# Patient Record
Sex: FEMALE | Race: WHITE | NOT HISPANIC OR LATINO | Employment: FULL TIME | URBAN - METROPOLITAN AREA
[De-identification: names, ages, dates, MRNs, and addresses within clinical notes are randomized per-mention and may not be internally consistent; named-entity substitution may affect disease eponyms.]

---

## 2018-03-16 ENCOUNTER — PATIENT MESSAGE (OUTPATIENT)
Dept: OBSTETRICS AND GYNECOLOGY | Facility: CLINIC | Age: 38
End: 2018-03-16

## 2018-04-20 ENCOUNTER — OFFICE VISIT (OUTPATIENT)
Dept: OBSTETRICS AND GYNECOLOGY | Facility: CLINIC | Age: 38
End: 2018-04-20
Payer: COMMERCIAL

## 2018-04-20 VITALS
HEART RATE: 84 BPM | BODY MASS INDEX: 35.99 KG/M2 | RESPIRATION RATE: 16 BRPM | WEIGHT: 216 LBS | DIASTOLIC BLOOD PRESSURE: 68 MMHG | SYSTOLIC BLOOD PRESSURE: 122 MMHG | HEIGHT: 65 IN

## 2018-04-20 DIAGNOSIS — R63.5 WEIGHT GAIN: ICD-10-CM

## 2018-04-20 DIAGNOSIS — Z01.419 WELL WOMAN EXAM WITH ROUTINE GYNECOLOGICAL EXAM: Primary | ICD-10-CM

## 2018-04-20 DIAGNOSIS — R53.83 FATIGUE, UNSPECIFIED TYPE: ICD-10-CM

## 2018-04-20 DIAGNOSIS — R10.2 PELVIC PAIN IN FEMALE: ICD-10-CM

## 2018-04-20 PROCEDURE — 99999 PR PBB SHADOW E&M-EST. PATIENT-LVL III: CPT | Mod: PBBFAC,,, | Performed by: OBSTETRICS & GYNECOLOGY

## 2018-04-20 PROCEDURE — 99395 PREV VISIT EST AGE 18-39: CPT | Mod: S$GLB,,, | Performed by: OBSTETRICS & GYNECOLOGY

## 2018-04-20 NOTE — PROGRESS NOTES
Subjective:    Patient ID: Lacey Benz is a 37 y.o. female.     Chief Complaint: Annual Well Woman Exam     History of Present Illness:  Lacey MITTAL presents today for Annual Well Woman exam. She complains of fatigue, weight gain, edema, headaches, getting worse lately. She states she exercises in the GYM 5 days a week and has been watching her diet and has gained about 35-40# in the past year.  She reports pelvic pain with certain exercises mostly on her left.  She denies breast tenderness, masses, nipple discharge.  She reports Occasional urinary stress incontinence with cough, sneezing , or jumping rope. with urination. Bowel movements have not significantly changed. Her menses are absent since [facility] 3 years ago.    Menstrual History:   Patient's last menstrual period was 2015..     OB History      Para Term  AB Living    3 3 2 1 0 2    SAB TAB Ectopic Multiple Live Births    0       3            Review of Systems   Constitutional: Positive for fatigue and unexpected weight change. Negative for activity change, appetite change, chills, diaphoresis and fever.   HENT: Negative for mouth sores and tinnitus.    Eyes: Negative for discharge and visual disturbance.   Respiratory: Negative for cough, shortness of breath and wheezing.    Cardiovascular: Positive for leg swelling. Negative for chest pain and palpitations.   Gastrointestinal: Positive for abdominal pain. Negative for blood in stool, constipation, diarrhea, nausea and vomiting.   Endocrine: Negative for diabetes, hair loss, hot flashes, hyperthyroidism and hypothyroidism.   Genitourinary: Positive for urgency and urinary incontinence. Negative for decreased libido, dyspareunia, dysuria, flank pain, frequency, genital sores, hematuria, menorrhagia, menstrual problem, pelvic pain, vaginal bleeding, vaginal discharge, vaginal pain, postcoital bleeding and vaginal odor.   Musculoskeletal: Negative for back pain, joint swelling and  myalgias.   Skin:  Negative for rash, no acne and hair changes.   Neurological: Positive for headaches. Negative for seizures, syncope and numbness.   Hematological: Negative for adenopathy. Does not bruise/bleed easily.   Psychiatric/Behavioral: Negative for sleep disturbance. The patient is not nervous/anxious.    Breast: Negative for breast pain and nipple discharge        Objective:    Vital Signs:  Vitals:    04/20/18 1603   BP: 122/68   Pulse: 84   Resp: 16       Physical Exam:  General:  alert,normal appearing gravid female   Skin:  Skin color, texture, turgor normal. No rashes or lesions   HEENT:  conjunctivae/corneas clear. PERRL.   Neck: supple, trachea midline, no adenopathy or thyromegally   Respiratory:  clear to auscultation bilaterally   Heart:  regular rate and rhythm, S1, S2 normal, no murmur, click, rub or gallop   Breasts:   Nipples are protruding and have no nipple discharge. No palpable masses, erythema, skin changes, tenderness, or adenopathy.   Abdomen:  soft, non-tender. Bowel sounds normal. No masses,  no organomegaly   Pelvis: External genitalia: normal general appearance  Urinary system: urethral meatus normal, bladder nontender  Vaginal: normal mucosa without prolapse or lesions  Cervix: normal appearance  Uterus: normal single, nontender  Adnexa: normal bimanual exam   Extremities: Normal ROM; no edema, no cyanosis Bluish discolored area left calf consistent with bruise.   Neurologial: Normal strength and tone. No focal numbness or weakness. Reflexes 2+ and equal.   Psychiatric: normal mood, speech, dress, and thought processes           Assessment:      1. Well woman exam with routine gynecological exam    2. Fatigue, unspecified type    3. Weight gain    4. Pelvic pain in female          Plan:      Well woman exam with routine gynecological exam    Fatigue, unspecified type  -     CBC auto differential; Future; Expected date: 04/20/2018  -     Comprehensive metabolic panel; Future;  Expected date: 04/20/2018  -     Lipid panel; Future; Expected date: 04/20/2018  -     TSH; Future; Expected date: 04/20/2018    Weight gain  -     CBC auto differential; Future; Expected date: 04/20/2018  -     Comprehensive metabolic panel; Future; Expected date: 04/20/2018  -     Lipid panel; Future; Expected date: 04/20/2018  -     TSH; Future; Expected date: 04/20/2018    Pelvic pain in female  -     US OB/GYN Procedure ([facility]); Future; Expected date: 04/20/2018        COUNSELING:  Lacey MITTAL was counseled on A.C.O.G. Pap guidelines and recommendations for yearly pelvic exams in addition to recommendations for yearly mammograms and monthly self breast exams. In addition she was counseled on adequate intake of calcium and vitamin D; to see her PCP for other health maintenance

## 2018-04-23 ENCOUNTER — PROCEDURE VISIT (OUTPATIENT)
Dept: OBSTETRICS AND GYNECOLOGY | Facility: CLINIC | Age: 38
End: 2018-04-23
Payer: COMMERCIAL

## 2018-04-23 ENCOUNTER — LAB VISIT (OUTPATIENT)
Dept: LAB | Facility: HOSPITAL | Age: 38
End: 2018-04-23
Attending: OBSTETRICS & GYNECOLOGY
Payer: COMMERCIAL

## 2018-04-23 DIAGNOSIS — R53.83 FATIGUE, UNSPECIFIED TYPE: ICD-10-CM

## 2018-04-23 DIAGNOSIS — R63.5 WEIGHT GAIN: ICD-10-CM

## 2018-04-23 DIAGNOSIS — R10.2 PELVIC PAIN IN FEMALE: ICD-10-CM

## 2018-04-23 LAB
ALBUMIN SERPL BCP-MCNC: 3.6 G/DL
ALP SERPL-CCNC: 71 U/L
ALT SERPL W/O P-5'-P-CCNC: 20 U/L
ANION GAP SERPL CALC-SCNC: 6 MMOL/L
AST SERPL-CCNC: 20 U/L
BASOPHILS # BLD AUTO: 0.06 K/UL
BASOPHILS NFR BLD: 0.7 %
BILIRUB SERPL-MCNC: 0.4 MG/DL
BUN SERPL-MCNC: 11 MG/DL
CALCIUM SERPL-MCNC: 9.1 MG/DL
CHLORIDE SERPL-SCNC: 106 MMOL/L
CHOLEST SERPL-MCNC: 196 MG/DL
CHOLEST/HDLC SERPL: 4.1 {RATIO}
CO2 SERPL-SCNC: 27 MMOL/L
CREAT SERPL-MCNC: 0.9 MG/DL
DIFFERENTIAL METHOD: NORMAL
EOSINOPHIL # BLD AUTO: 0.2 K/UL
EOSINOPHIL NFR BLD: 1.9 %
ERYTHROCYTE [DISTWIDTH] IN BLOOD BY AUTOMATED COUNT: 12.9 %
EST. GFR  (AFRICAN AMERICAN): >60 ML/MIN/1.73 M^2
EST. GFR  (NON AFRICAN AMERICAN): >60 ML/MIN/1.73 M^2
GLUCOSE SERPL-MCNC: 111 MG/DL
HCT VFR BLD AUTO: 44.6 %
HDLC SERPL-MCNC: 48 MG/DL
HDLC SERPL: 24.5 %
HGB BLD-MCNC: 14.8 G/DL
LDLC SERPL CALC-MCNC: 128.8 MG/DL
LYMPHOCYTES # BLD AUTO: 2.2 K/UL
LYMPHOCYTES NFR BLD: 24.4 %
MCH RBC QN AUTO: 30.7 PG
MCHC RBC AUTO-ENTMCNC: 33.2 G/DL
MCV RBC AUTO: 93 FL
MONOCYTES # BLD AUTO: 0.7 K/UL
MONOCYTES NFR BLD: 8.1 %
NEUTROPHILS # BLD AUTO: 5.8 K/UL
NEUTROPHILS NFR BLD: 64.9 %
NONHDLC SERPL-MCNC: 148 MG/DL
PLATELET # BLD AUTO: 223 K/UL
PMV BLD AUTO: 10.1 FL
POTASSIUM SERPL-SCNC: 4.7 MMOL/L
PROT SERPL-MCNC: 6.7 G/DL
RBC # BLD AUTO: 4.82 M/UL
SODIUM SERPL-SCNC: 139 MMOL/L
TRIGL SERPL-MCNC: 96 MG/DL
TSH SERPL DL<=0.005 MIU/L-ACNC: 1.25 UIU/ML
WBC # BLD AUTO: 8.9 K/UL

## 2018-04-23 PROCEDURE — 85025 COMPLETE CBC W/AUTO DIFF WBC: CPT

## 2018-04-23 PROCEDURE — 80061 LIPID PANEL: CPT

## 2018-04-23 PROCEDURE — 36415 COLL VENOUS BLD VENIPUNCTURE: CPT

## 2018-04-23 PROCEDURE — 76830 TRANSVAGINAL US NON-OB: CPT | Mod: S$GLB,,, | Performed by: OBSTETRICS & GYNECOLOGY

## 2018-04-23 PROCEDURE — 80053 COMPREHEN METABOLIC PANEL: CPT

## 2018-04-23 PROCEDURE — 84443 ASSAY THYROID STIM HORMONE: CPT

## 2018-04-24 ENCOUNTER — PATIENT MESSAGE (OUTPATIENT)
Dept: OBSTETRICS AND GYNECOLOGY | Facility: CLINIC | Age: 38
End: 2018-04-24

## 2018-05-02 ENCOUNTER — PATIENT MESSAGE (OUTPATIENT)
Dept: OBSTETRICS AND GYNECOLOGY | Facility: CLINIC | Age: 38
End: 2018-05-02

## 2018-05-02 NOTE — TELEPHONE ENCOUNTER
I have confirmed you did prescribe pt her CPAP 05/28/2013. The order for her new supplies is in your red folder.    Pt also stated she was seen by you on 04/20/18, and she had her urine tested but did not receive any results. I do not see any orders for urine in pts chart. Please contact pt to address her concerns, as she has been sending pt emails inquiring about these things.

## 2018-05-03 ENCOUNTER — TELEPHONE (OUTPATIENT)
Dept: OBSTETRICS AND GYNECOLOGY | Facility: CLINIC | Age: 38
End: 2018-05-03

## 2018-05-03 NOTE — TELEPHONE ENCOUNTER
I called for Lacey but she was unavailable so I spoke to Lacey's . He was informed that her urinalysis in the [place] office was normal.

## 2018-05-03 NOTE — TELEPHONE ENCOUNTER
Signed prescription DME for CPAP faxed to [phone] per patient request. Fax confirmation received. Prescription scanned to chart. Patient notified via patient portal.

## 2018-05-15 ENCOUNTER — PATIENT MESSAGE (OUTPATIENT)
Dept: OBSTETRICS AND GYNECOLOGY | Facility: CLINIC | Age: 38
End: 2018-05-15

## 2019-04-02 ENCOUNTER — OFFICE VISIT (OUTPATIENT)
Dept: NEUROLOGY | Facility: CLINIC | Age: 39
End: 2019-04-02
Payer: COMMERCIAL

## 2019-04-02 VITALS
HEIGHT: 65 IN | WEIGHT: 229.25 LBS | HEART RATE: 70 BPM | RESPIRATION RATE: 18 BRPM | BODY MASS INDEX: 38.2 KG/M2 | SYSTOLIC BLOOD PRESSURE: 118 MMHG | DIASTOLIC BLOOD PRESSURE: 80 MMHG

## 2019-04-02 DIAGNOSIS — E66.01 SEVERE OBESITY (BMI 35.0-39.9) WITH COMORBIDITY: ICD-10-CM

## 2019-04-02 DIAGNOSIS — H47.10 PAPILLEDEMA: Primary | ICD-10-CM

## 2019-04-02 DIAGNOSIS — H93.A9 PULSATILE TINNITUS: ICD-10-CM

## 2019-04-02 DIAGNOSIS — G89.29 CHRONIC INTRACTABLE HEADACHE, UNSPECIFIED HEADACHE TYPE: ICD-10-CM

## 2019-04-02 DIAGNOSIS — R51.9 CHRONIC INTRACTABLE HEADACHE, UNSPECIFIED HEADACHE TYPE: ICD-10-CM

## 2019-04-02 PROCEDURE — 99999 PR PBB SHADOW E&M-EST. PATIENT-LVL III: ICD-10-PCS | Mod: PBBFAC,,, | Performed by: PSYCHIATRY & NEUROLOGY

## 2019-04-02 PROCEDURE — 3008F BODY MASS INDEX DOCD: CPT | Mod: CPTII,S$GLB,, | Performed by: PSYCHIATRY & NEUROLOGY

## 2019-04-02 PROCEDURE — 99999 PR PBB SHADOW E&M-EST. PATIENT-LVL III: CPT | Mod: PBBFAC,,, | Performed by: PSYCHIATRY & NEUROLOGY

## 2019-04-02 PROCEDURE — 3008F PR BODY MASS INDEX (BMI) DOCUMENTED: ICD-10-PCS | Mod: CPTII,S$GLB,, | Performed by: PSYCHIATRY & NEUROLOGY

## 2019-04-02 PROCEDURE — 99204 PR OFFICE/OUTPT VISIT, NEW, LEVL IV, 45-59 MIN: ICD-10-PCS | Mod: S$GLB,,, | Performed by: PSYCHIATRY & NEUROLOGY

## 2019-04-02 PROCEDURE — 99204 OFFICE O/P NEW MOD 45 MIN: CPT | Mod: S$GLB,,, | Performed by: PSYCHIATRY & NEUROLOGY

## 2019-04-02 NOTE — PROGRESS NOTES
"Consult from MECHELLE Ojeda    HPI: Lacey Benz is a 38 y.o. female for "papilledema and increased intracranial pressure"  The patient has severe obesity and had new visual change 3 weeks ago.   She has also has some headache and some difficulty with foggy thought processes and mild dizziness  Headaches are described as variable pain which is not constant and mild. Prior to 3 weeks ago she did have some headaches over the past couple of years, but not daily but headaches can be severe.   A few weeks ago, she saw some specs in her visual field which resolved. Optometrist saw optic nerve edema.   PCP performed MRI brain (see below).  She saw ophthalmology, Dr Frazier yesterday after optometrist suspected optic nerve edema-and he confirmed papilledema.   She has no personal history of DVT but mom  of DVT at age 26. She does not take Vit A.   She does have some pulsatile tinnitus  She has gained weight over the past few years despite exercising.   She has seen nephrology for proteinuria prior.     She lives with her  and their 2 sons. She works as a .     Review of Systems   Constitutional: Negative for fever.   HENT: Negative for nosebleeds.    Eyes: Positive for blurred vision.   Respiratory: Negative for hemoptysis.    Cardiovascular: Negative for leg swelling.   Gastrointestinal: Negative for blood in stool.   Genitourinary: Negative for hematuria.   Musculoskeletal: Negative for falls.   Skin: Negative for rash.   Neurological: Positive for dizziness and headaches.   Psychiatric/Behavioral: The patient does not have insomnia.          I have reviewed all of this patient's past medical and surgical histories as well as family and social histories and active allergies and medications as documented in the electronic medical record.    Exam:  Gen Appearance, well developed/nourished in no apparent distress  CV: 2+ distal pulses with no edema or swelling  Neuro:  MS: Awake, alert, oriented to " place, person, time, situation. Sustains attention. Recent/remote memory intact, Language is full to spontaneous speech/repetition/naming/comprehension. Fund of Knowledge is full  CN: Optic discs are not well visualized, PERRL, Extraoccular movements and visual fields are full. Normal facial sensation and strength, Hearing symmetric, Tongue and Palate are midline and strong. Shoulder Shrug symmetric and strong.  Motor: Normal bulk, tone, no abnormal movements. 5/5 strength bilateral upper/lower extremities with 2+ reflexes and bilateral plantar response  Sensory: symmetric to light touch, pain, temp, and vibration/proprioception. Romberg negative  Cerebellar: Finger-nose,Heal-shin, Rapid alternating movements intact  Gait: Normal stance, no ataxia    Imaging: MRI brain 3/20/19: No mass, but empty sella and high signal fluid around the optic nerve.   Labs:3/2018 CMP,CBC reviewed      Assessment/Plan: Lacey Benz is a 38 y.o. female with 2 years of headaches, sometimes severe now with 3 weeks of worse headaches and visual changes with pulsatile tinnitus suggesting increase intracranial pressure.   3/2019 MRI brain shows high fluid signal around the optic nerve which can be seen in Pseudotumor cerebri. Her exam is consistent with papilledema as well.   I recommend:   1. MRV brain (note her mom  of PE at age 26)  2. If this is unremarkable, she will need an LP to measure OP and r/o infection   3. If LP findings are consistent with Pseudotumor Cerebri, she will be started on Diamox and needs to reduce her severe obesity   -Monitor CMP regularly on Diamox if used (she has a history of proteinuria evaluated by nephrology prior)  4. Obtain notes from Dr Frazier, ophthalmology who is also seeing the patient.   RTC 6 weeks. Patient is asked to call for results/ further recommendations.   CC: MECHELLE Ojeda

## 2019-04-02 NOTE — LETTER
April 2, 2019      Princess Ojeda, JARED  [address]  [place]  [place] [address]           [facility]  [address] [address]  Phone: [phone]  Fax: [phone]          Patient: Lacey Benz   MR Number: [id number]   YOB: 1980   Date of Visit: 4/2/2019       Dear Princess Ojeda:    Thank you for referring Lacey Benz to me for evaluation. Attached you will find relevant portions of my assessment and plan of care.    If you have questions, please do not hesitate to call me. I look forward to following Lacey Benz along with you.    Sincerely,    Riki Patrick MD    Enclosure  CC:  No Recipients    If you would like to receive this communication electronically, please contact [email or web address] or [phone] to request more information on [facility] access.    For providers and/or their staff who would like to refer a patient to [facility], please contact us through our one-stop-shop provider referral line, [facility], at [phone].    If you feel you have received this communication in error or would no longer like to receive these types of communications, please e-mail [email or web address]

## 2019-04-05 ENCOUNTER — HOSPITAL ENCOUNTER (OUTPATIENT)
Dept: RADIOLOGY | Facility: HOSPITAL | Age: 39
Discharge: HOME | End: 2019-04-05
Attending: PSYCHIATRY & NEUROLOGY
Payer: COMMERCIAL

## 2019-04-05 DIAGNOSIS — R51.9 CHRONIC INTRACTABLE HEADACHE, UNSPECIFIED HEADACHE TYPE: ICD-10-CM

## 2019-04-05 DIAGNOSIS — G89.29 CHRONIC INTRACTABLE HEADACHE, UNSPECIFIED HEADACHE TYPE: ICD-10-CM

## 2019-04-05 DIAGNOSIS — H93.A9 PULSATILE TINNITUS: ICD-10-CM

## 2019-04-05 DIAGNOSIS — H47.10 PAPILLEDEMA: ICD-10-CM

## 2019-04-05 PROCEDURE — 70544 MR ANGIOGRAPHY HEAD W/O DYE: CPT | Mod: 26,,, | Performed by: RADIOLOGY

## 2019-04-05 PROCEDURE — 70544 MRV BRAIN WITHOUT CONTRAST: ICD-10-PCS | Mod: 26,,, | Performed by: RADIOLOGY

## 2019-04-05 PROCEDURE — 70544 MR ANGIOGRAPHY HEAD W/O DYE: CPT | Mod: TC

## 2019-04-08 DIAGNOSIS — H47.10 PAPILLEDEMA: Primary | ICD-10-CM

## 2019-04-15 ENCOUNTER — HOSPITAL ENCOUNTER (OUTPATIENT)
Dept: RADIOLOGY | Facility: HOSPITAL | Age: 39
Discharge: HOME | End: 2019-04-15
Attending: PSYCHIATRY & NEUROLOGY
Payer: COMMERCIAL

## 2019-04-15 DIAGNOSIS — H47.10 PAPILLEDEMA: ICD-10-CM

## 2019-04-15 LAB
BASOPHILS NFR CSF MANUAL: 1 %
CLARITY CSF: CLEAR
COLOR CSF: COLORLESS
EOSINOPHIL NFR CSF MANUAL: 1 %
GLUCOSE CSF-MCNC: 59 MG/DL (ref 40–70)
LYMPHOCYTES NFR CSF MANUAL: 40 % (ref 40–80)
MONOS+MACROS NFR CSF MANUAL: 7 % (ref 15–45)
NEUTROPHILS NFR CSF MANUAL: 51 % (ref 0–6)
PROT CSF-MCNC: 31 MG/DL (ref 15–40)
RBC # CSF: 431 /CU MM
SPECIMEN VOL CSF: 1.5 ML
WBC # CSF: 2 /CU MM (ref 0–5)

## 2019-04-15 PROCEDURE — 84157 ASSAY OF PROTEIN OTHER: CPT

## 2019-04-15 PROCEDURE — 62270 FL LUMBAR PUNCTURE: ICD-10-PCS | Mod: ,,, | Performed by: RADIOLOGY

## 2019-04-15 PROCEDURE — 87070 CULTURE OTHR SPECIMN AEROBIC: CPT

## 2019-04-15 PROCEDURE — 62270 DX LMBR SPI PNXR: CPT

## 2019-04-15 PROCEDURE — 77003 FLUOROGUIDE FOR SPINE INJECT: CPT | Mod: 26,,, | Performed by: RADIOLOGY

## 2019-04-15 PROCEDURE — 77003 FL LUMBAR PUNCTURE: ICD-10-PCS | Mod: 26,,, | Performed by: RADIOLOGY

## 2019-04-15 PROCEDURE — 82945 GLUCOSE OTHER FLUID: CPT

## 2019-04-15 PROCEDURE — 62270 DX LMBR SPI PNXR: CPT | Mod: ,,, | Performed by: RADIOLOGY

## 2019-04-15 PROCEDURE — 87205 SMEAR GRAM STAIN: CPT

## 2019-04-15 PROCEDURE — 89051 BODY FLUID CELL COUNT: CPT

## 2019-04-15 PROCEDURE — 86592 SYPHILIS TEST NON-TREP QUAL: CPT

## 2019-04-15 RX ORDER — ACETAZOLAMIDE 250 MG/1
TABLET ORAL
Qty: 60 TABLET | Refills: 11 | Status: SHIPPED | OUTPATIENT
Start: 2019-04-15 | End: 2019-05-30

## 2019-04-15 RX ORDER — LIDOCAINE HYDROCHLORIDE 10 MG/ML
1 INJECTION INFILTRATION; PERINEURAL ONCE
Status: DISCONTINUED | OUTPATIENT
Start: 2019-04-15 | End: 2019-04-16 | Stop reason: HOSPADM

## 2019-04-15 RX ORDER — LIDOCAINE HYDROCHLORIDE 10 MG/ML
10 INJECTION INFILTRATION; PERINEURAL ONCE
Status: DISCONTINUED | OUTPATIENT
Start: 2019-04-15 | End: 2019-04-15

## 2019-04-15 NOTE — H&P
Radiology History & Physical      SUBJECTIVE:     Chief Complaint:     History of Present Illness:  Lacey Benz is a 38 y.o. female who presents for concern for IIH/pseudotumor.   Past Medical History:   Diagnosis Date    Abnormal Pap smear of vagina     Endometriosis of cervix      Past Surgical History:   Procedure Laterality Date     SECTION      x2    CHOLECYSTECTOMY  2010    CYSTOSCOPY N/A 2015    Performed by [facility] at [facility]    DILATION AND CURETTAGE OF UTERUS      HYSTERECTOMY      HYSTERECTOMY-TOTAL LAPAROSCOPIC (TLH) N/A 2015    Performed by [facility] at [facility] OR    TONSILLECTOMY, ADENOIDECTOMY      TUBAL LIGATION         Home Meds:   Prior to Admission medications    Not on File     Anticoagulants/Antiplatelets: no anticoagulation    Allergies:   Review of patient's allergies indicates:   Allergen Reactions    Penicillins      Other reaction(s): Arthralgia (Joint Pain)     Sedation History:  no adverse reactions    Review of Systems:   Hematological: no known coagulopathies  Respiratory: no shortness of breath  Cardiovascular: no chest pain  Gastrointestinal: no abdominal pain  Genito-Urinary: no dysuria  Musculoskeletal: negative  Neurological: no TIA or stroke symptoms         OBJECTIVE:     Vital Signs (Most Recent)       Physical Exam:  ASA: 1  Mallampati: 2    General: no acute distress  Mental Status: alert and oriented to person, place and time  HEENT: normocephalic, atraumatic  Chest: unlabored breathing  Heart: no heaves  Abdomen: nondistended  Extremity: moves all extremities    Laboratory  Lab Results   Component Value Date    INR 1.0 2015       Lab Results   Component Value Date    WBC 8.90 2018    HGB 14.8 2018    HCT 44.6 2018    MCV 93 2018     2018      Lab Results   Component Value Date     (H) 2018     2018    K 4.7 2018     2018     CO2 27 04/23/2018    BUN 11 04/23/2018    CREATININE 0.9 04/23/2018    CALCIUM 9.1 04/23/2018    ALT 20 04/23/2018    AST 20 04/23/2018    ALBUMIN 3.6 04/23/2018    BILITOT 0.4 04/23/2018       ASSESSMENT/PLAN:     Sedation Plan: none  Patient will undergo lumbar puncture.    Magdy Page MD  Radiology

## 2019-04-15 NOTE — PROCEDURES
Radiology Post-Procedure Note    Pre Op Diagnosis: Concern for IIH/pseudotumor cerebri    Post Op Diagnosis: Same    Procedure: lumbar puncture    Procedure performed by: Abhishek Aguillon    Written Informed Consent Obtained: Yes    Specimen Removed: 12 mL CSF    Estimated Blood Loss: Minimal    Findings: Following written informed consent and sterile prep and drape, a 22 gauge spinal needle was inserted at L4 - L5 intralaminar space under fluoroscopic surveillance.  12 mL clear CSF removed and sent to the lab for further analysis.  There were no complications.    Opening pressure 44 cm water  Closing pressure 39 cm water    Patient tolerated procedure well.    Magdy Page MD  Radiology

## 2019-04-16 ENCOUNTER — TELEPHONE (OUTPATIENT)
Dept: NEUROLOGY | Facility: CLINIC | Age: 39
End: 2019-04-16

## 2019-04-16 NOTE — TELEPHONE ENCOUNTER
Spoke with and gave results for her LP done yesterday. She states that she had a rough time yesterday during the procedure, she wasn't getting numb and she was stuck multiple times. She states that she was unable to go to work today, she is still laying flat due to the headache she is having. She states that she took some OTC Tylenol with no relief.

## 2019-04-16 NOTE — TELEPHONE ENCOUNTER
Spoke with patient she states that the pain is worse when standing or sitting, laying down helps relieve the pain. Voiced understanding about instruction to increase fluids with caffeine and to stay laying flat for the day.

## 2019-04-16 NOTE — TELEPHONE ENCOUNTER
Is the pain worse with sitting or standing?  If so-Let her know this does occur post LP sometimes. Ask her to increase her liquids containing caffeine (mountain dew) and continue to lay flat for the day. Sometime there can a be a small leak of the spinal fluid but it usually self corrects in a day or two.  If pain worsens, she should report to the ER.   If the the pain is the same regardless of laying vs sitting/standing, let me know.   Thanks.

## 2019-04-17 LAB — VDRL CSF QL: NORMAL

## 2019-04-20 LAB
BACTERIA CSF CULT: NO GROWTH
GRAM STN SPEC: NORMAL

## 2019-05-22 ENCOUNTER — TELEPHONE (OUTPATIENT)
Dept: NEUROLOGY | Facility: CLINIC | Age: 39
End: 2019-05-22

## 2019-05-22 NOTE — TELEPHONE ENCOUNTER
Notified patient after speaking with our Nurse Practitioner Katherin Green, she is advising patient go and see and eye doctor for the mucus and blood shot eye as the Diamox usually dries out the eyes. Patient voiced understanding appointment moved up to next week.

## 2019-05-30 ENCOUNTER — LAB VISIT (OUTPATIENT)
Dept: LAB | Facility: HOSPITAL | Age: 39
End: 2019-05-30
Attending: PSYCHIATRY & NEUROLOGY
Payer: COMMERCIAL

## 2019-05-30 ENCOUNTER — OFFICE VISIT (OUTPATIENT)
Dept: NEUROLOGY | Facility: CLINIC | Age: 39
End: 2019-05-30
Payer: COMMERCIAL

## 2019-05-30 VITALS
SYSTOLIC BLOOD PRESSURE: 118 MMHG | HEIGHT: 65 IN | WEIGHT: 224.19 LBS | BODY MASS INDEX: 37.35 KG/M2 | DIASTOLIC BLOOD PRESSURE: 72 MMHG | RESPIRATION RATE: 14 BRPM | HEART RATE: 76 BPM

## 2019-05-30 DIAGNOSIS — E66.01 SEVERE OBESITY (BMI 35.0-39.9) WITH COMORBIDITY: ICD-10-CM

## 2019-05-30 DIAGNOSIS — G93.2 PSEUDOTUMOR CEREBRI: ICD-10-CM

## 2019-05-30 DIAGNOSIS — G93.2 PSEUDOTUMOR CEREBRI: Primary | ICD-10-CM

## 2019-05-30 LAB
ALBUMIN SERPL BCP-MCNC: 3.7 G/DL (ref 3.5–5.2)
ALP SERPL-CCNC: 85 U/L (ref 55–135)
ALT SERPL W/O P-5'-P-CCNC: 13 U/L (ref 10–44)
ANION GAP SERPL CALC-SCNC: 7 MMOL/L (ref 8–16)
AST SERPL-CCNC: 12 U/L (ref 10–40)
BILIRUB SERPL-MCNC: 0.3 MG/DL (ref 0.1–1)
BUN SERPL-MCNC: 8 MG/DL (ref 6–20)
CALCIUM SERPL-MCNC: 9 MG/DL (ref 8.7–10.5)
CHLORIDE SERPL-SCNC: 111 MMOL/L (ref 95–110)
CO2 SERPL-SCNC: 25 MMOL/L (ref 23–29)
CREAT SERPL-MCNC: 0.8 MG/DL (ref 0.5–1.4)
EST. GFR  (AFRICAN AMERICAN): >60 ML/MIN/1.73 M^2
EST. GFR  (NON AFRICAN AMERICAN): >60 ML/MIN/1.73 M^2
GLUCOSE SERPL-MCNC: 101 MG/DL (ref 70–110)
POTASSIUM SERPL-SCNC: 3.9 MMOL/L (ref 3.5–5.1)
PROT SERPL-MCNC: 7 G/DL (ref 6–8.4)
SODIUM SERPL-SCNC: 143 MMOL/L (ref 136–145)

## 2019-05-30 PROCEDURE — 3008F BODY MASS INDEX DOCD: CPT | Mod: CPTII,S$GLB,, | Performed by: PSYCHIATRY & NEUROLOGY

## 2019-05-30 PROCEDURE — 80053 COMPREHEN METABOLIC PANEL: CPT

## 2019-05-30 PROCEDURE — 99999 PR PBB SHADOW E&M-EST. PATIENT-LVL III: ICD-10-PCS | Mod: PBBFAC,,, | Performed by: PSYCHIATRY & NEUROLOGY

## 2019-05-30 PROCEDURE — 3008F PR BODY MASS INDEX (BMI) DOCUMENTED: ICD-10-PCS | Mod: CPTII,S$GLB,, | Performed by: PSYCHIATRY & NEUROLOGY

## 2019-05-30 PROCEDURE — 99214 PR OFFICE/OUTPT VISIT, EST, LEVL IV, 30-39 MIN: ICD-10-PCS | Mod: S$GLB,,, | Performed by: PSYCHIATRY & NEUROLOGY

## 2019-05-30 PROCEDURE — 36415 COLL VENOUS BLD VENIPUNCTURE: CPT

## 2019-05-30 PROCEDURE — 99999 PR PBB SHADOW E&M-EST. PATIENT-LVL III: CPT | Mod: PBBFAC,,, | Performed by: PSYCHIATRY & NEUROLOGY

## 2019-05-30 PROCEDURE — 99214 OFFICE O/P EST MOD 30 MIN: CPT | Mod: S$GLB,,, | Performed by: PSYCHIATRY & NEUROLOGY

## 2019-05-30 RX ORDER — BUPROPION HYDROCHLORIDE 300 MG/1
300 TABLET ORAL DAILY
COMMUNITY
End: 2020-06-10

## 2019-05-30 RX ORDER — ACETAZOLAMIDE 500 MG/1
500 CAPSULE, EXTENDED RELEASE ORAL 2 TIMES DAILY
Qty: 60 CAPSULE | Refills: 11 | Status: SHIPPED | OUTPATIENT
Start: 2019-05-30 | End: 2019-07-16

## 2019-05-30 NOTE — PROGRESS NOTES
HPI: Lacey Benz is a 38 y.o. female with 2 years of headaches, sometimes severe now with 3 weeks of worse headaches and visual changes with pulsatile tinnitus suggesting increase intracranial pressure.   3/2019 MRI brain shows high fluid signal around the optic nerve which can be seen in Pseudotumor cerebri. Her exam is consistent with papilledema as well. LP shows findings consistent with Pseudotumor cerebri      Since the last visit, the patient had a headache after LP and this resolved after 5 days of severe headache.     Her baseline headache is improved at times, but present at others.  She went to the eye doctor due to sclera changes and responded to eye drops after she called about pus/discharge from eye    She is taking Diamox 250mg BID.   Has lost 5 pounds    Her prior note from ophthalmology is reviewed- papilledema noted. She has follow up with Dr Vang  Review of Systems   Constitutional: Negative for fever.   HENT: Negative for nosebleeds.    Eyes: Positive for blurred vision.   Respiratory: Negative for hemoptysis.    Cardiovascular: Negative for leg swelling.   Gastrointestinal: Negative for blood in stool.   Genitourinary: Negative for hematuria.   Musculoskeletal: Negative for falls.   Skin: Negative for rash.   Neurological: Positive for dizziness and headaches.   Psychiatric/Behavioral: The patient does not have insomnia.          I have reviewed all of this patient's past medical and surgical histories as well as family and social histories and active allergies and medications as documented in the electronic medical record.    Exam:  Gen Appearance, well developed/nourished in no apparent distress  CV: 2+ distal pulses with no edema or swelling  Neuro:  MS: Awake, alert, oriented to place, person, time, situation. Sustains attention. Recent/remote memory intact, Language is full to spontaneous speech/repetition/naming/comprehension. Fund of Knowledge is full  CN: Optic discs are not  well visualized due to mild papilledema, PERRL, Extraoccular movements and visual fields are full. Normal facial sensation and strength, Hearing symmetric, Tongue and Palate are midline and strong. Shoulder Shrug symmetric and strong.  Motor: Normal bulk, tone, no abnormal movements. 5/5 strength bilateral upper/lower extremities with 2+ reflexes and no clonus  Sensory: symmetric to light touch, pain, temp, and vibration, Romberg negative  Cerebellar: Finger-nose,Heal-shin, Rapid alternating movements intact  Gait: Normal stance, no ataxia    Imaging: MRI brain 3/20/19: No mass, but empty sella and high signal fluid around the optic nerve.     4/2019 MRV: Normal MRV.    Labs:3/2018 CMP,CBC reviewed    [id number]: The opening pressure was 44 cm of water. CSF labs and culture all normal      Assessment/Plan: Lacey Benz is a 38 y.o. female with 2 years of headaches, sometimes severe now with 3 weeks of worse headaches and visual changes with pulsatile tinnitus suggesting increase intracranial pressure.   3/2019 MRI brain shows high fluid signal around the optic nerve which can be seen in Pseudotumor cerebri. Her exam is consistent with papilledema as well. LP shows findings consistent with Pseudotumor cerebri  I recommend:     1.  Diamox to increase to 500mg BID over 3 weeks unless side effect  -Monitor CMP today and regularly on Diamox (she has a history of proteinuria evaluated by nephrology prior). She reports that her PCP found some elevation in a liver lab prior which she never followed up on.   2. Continue to follow with ophthalmology as planned  3. Weight loss urged to reduce severe obesity which contributes to this condition  RTC 12 weeks if not improved or 6 months if better

## 2019-07-15 ENCOUNTER — PATIENT MESSAGE (OUTPATIENT)
Dept: NEUROLOGY | Facility: CLINIC | Age: 39
End: 2019-07-15

## 2019-07-16 ENCOUNTER — OFFICE VISIT (OUTPATIENT)
Dept: NEUROLOGY | Facility: CLINIC | Age: 39
End: 2019-07-16
Payer: COMMERCIAL

## 2019-07-16 ENCOUNTER — LAB VISIT (OUTPATIENT)
Dept: LAB | Facility: HOSPITAL | Age: 39
End: 2019-07-16
Attending: PSYCHIATRY & NEUROLOGY
Payer: COMMERCIAL

## 2019-07-16 VITALS
BODY MASS INDEX: 37.65 KG/M2 | DIASTOLIC BLOOD PRESSURE: 60 MMHG | HEIGHT: 65 IN | SYSTOLIC BLOOD PRESSURE: 110 MMHG | WEIGHT: 226 LBS | RESPIRATION RATE: 16 BRPM | HEART RATE: 62 BPM

## 2019-07-16 DIAGNOSIS — G93.2 PSEUDOTUMOR CEREBRI: ICD-10-CM

## 2019-07-16 DIAGNOSIS — E66.01 SEVERE OBESITY (BMI 35.0-39.9) WITH COMORBIDITY: ICD-10-CM

## 2019-07-16 DIAGNOSIS — T88.7XXA SIDE EFFECT OF MEDICATION: ICD-10-CM

## 2019-07-16 DIAGNOSIS — G93.2 PSEUDOTUMOR CEREBRI: Primary | ICD-10-CM

## 2019-07-16 LAB
ALBUMIN SERPL BCP-MCNC: 3.9 G/DL (ref 3.5–5.2)
ALP SERPL-CCNC: 90 U/L (ref 55–135)
ALT SERPL W/O P-5'-P-CCNC: 16 U/L (ref 10–44)
ANION GAP SERPL CALC-SCNC: 9 MMOL/L (ref 8–16)
AST SERPL-CCNC: 12 U/L (ref 10–40)
BILIRUB SERPL-MCNC: 0.6 MG/DL (ref 0.1–1)
BUN SERPL-MCNC: 10 MG/DL (ref 6–20)
CALCIUM SERPL-MCNC: 9.2 MG/DL (ref 8.7–10.5)
CHLORIDE SERPL-SCNC: 109 MMOL/L (ref 95–110)
CO2 SERPL-SCNC: 24 MMOL/L (ref 23–29)
CREAT SERPL-MCNC: 1 MG/DL (ref 0.5–1.4)
EST. GFR  (AFRICAN AMERICAN): >60 ML/MIN/1.73 M^2
EST. GFR  (NON AFRICAN AMERICAN): >60 ML/MIN/1.73 M^2
GLUCOSE SERPL-MCNC: 123 MG/DL (ref 70–110)
POTASSIUM SERPL-SCNC: 3.4 MMOL/L (ref 3.5–5.1)
PROT SERPL-MCNC: 7 G/DL (ref 6–8.4)
SODIUM SERPL-SCNC: 142 MMOL/L (ref 136–145)

## 2019-07-16 PROCEDURE — 80053 COMPREHEN METABOLIC PANEL: CPT

## 2019-07-16 PROCEDURE — 36415 COLL VENOUS BLD VENIPUNCTURE: CPT

## 2019-07-16 PROCEDURE — 3008F BODY MASS INDEX DOCD: CPT | Mod: CPTII,S$GLB,, | Performed by: PSYCHIATRY & NEUROLOGY

## 2019-07-16 PROCEDURE — 3008F PR BODY MASS INDEX (BMI) DOCUMENTED: ICD-10-PCS | Mod: CPTII,S$GLB,, | Performed by: PSYCHIATRY & NEUROLOGY

## 2019-07-16 PROCEDURE — 99214 OFFICE O/P EST MOD 30 MIN: CPT | Mod: S$GLB,,, | Performed by: PSYCHIATRY & NEUROLOGY

## 2019-07-16 PROCEDURE — 99999 PR PBB SHADOW E&M-EST. PATIENT-LVL III: ICD-10-PCS | Mod: PBBFAC,,, | Performed by: PSYCHIATRY & NEUROLOGY

## 2019-07-16 PROCEDURE — 99999 PR PBB SHADOW E&M-EST. PATIENT-LVL III: CPT | Mod: PBBFAC,,, | Performed by: PSYCHIATRY & NEUROLOGY

## 2019-07-16 PROCEDURE — 99214 PR OFFICE/OUTPT VISIT, EST, LEVL IV, 30-39 MIN: ICD-10-PCS | Mod: S$GLB,,, | Performed by: PSYCHIATRY & NEUROLOGY

## 2019-07-16 RX ORDER — FUROSEMIDE 20 MG/1
20 TABLET ORAL DAILY
Qty: 30 TABLET | Refills: 11 | Status: SHIPPED | OUTPATIENT
Start: 2019-07-16 | End: 2020-02-06 | Stop reason: SDUPTHER

## 2019-07-16 RX ORDER — ACETAZOLAMIDE 125 MG/1
125 TABLET ORAL 2 TIMES DAILY
Qty: 60 TABLET | Refills: 11 | Status: SHIPPED | OUTPATIENT
Start: 2019-07-16 | End: 2020-01-28

## 2019-07-16 NOTE — PROGRESS NOTES
HPI: Lacey Benz is a 38 y.o. female with 2 years of headaches, sometimes severe now with 3 weeks of worse headaches and visual changes with pulsatile tinnitus suggesting increase intracranial pressure.   3/2019 MRI brain shows high fluid signal around the optic nerve which can be seen in Pseudotumor cerebri. Her exam is consistent with papilledema as well. LP shows findings consistent with Pseudotumor cerebri      The patient emailed yesterday stating she continues with headaches after one month of diamox and with poor energy on this medication  She was asked to make this appointment    She actually stopped this medication on Sunday. She had some fluid retention in the ankles and this is resolved  She did have some tingling which was tolerable.   She was having fatigue which was troublesome.    She states her last eye exam showed some improvement.     Weight is up one pound    Review of Systems   Constitutional: Negative for fever.   HENT: Negative for nosebleeds.    Eyes: Positive for blurred vision.   Respiratory: Negative for hemoptysis.    Cardiovascular: Negative for leg swelling.   Gastrointestinal: Negative for blood in stool.   Genitourinary: Negative for hematuria.   Musculoskeletal: Negative for falls.   Skin: Negative for rash.   Neurological: Positive for dizziness and headaches.   Psychiatric/Behavioral: The patient does not have insomnia.          I have reviewed all of this patient's past medical and surgical histories as well as family and social histories and active allergies and medications as documented in the electronic medical record.    Exam:  Gen Appearance, well developed/nourished in no apparent distress  CV: 2+ distal pulses with no edema or swelling  Neuro:  MS: Awake, alert, oriented to place, person, time, situation. Sustains attention. Recent/remote memory intact, Language is full to spontaneous speech/repetition/naming/comprehension. Fund of Knowledge is full  CN: Optic  discs show no venous pulsations and mild papilledema which is improved today, PERRL, Extraoccular movements and visual fields are full. Normal facial sensation and strength, Hearing symmetric, Tongue and Palate are midline and strong. Shoulder Shrug symmetric and strong.  Motor: Normal bulk, tone, no abnormal movements. 5/5 strength bilateral upper/lower extremities with 2+ reflexes and no clonus  Sensory: symmetric to light touch, pain, temp, and vibration, Romberg negative  Cerebellar: Finger-nose,Heal-shin, Rapid alternating movements intact  Gait: Normal stance, no ataxia    Imaging: MRI brain 3/20/19: No mass, but empty sella and high signal fluid around the optic nerve.     4/2019 MRV: Normal MRV.    Labs:3/2018 CMP,CBC reviewed  5/2019 CMP reviewed    [id number]: The opening pressure was 44 cm of water. CSF labs and culture all normal      Assessment/Plan: Lacey Benz is a 38 y.o. female with 2 years of headaches, sometimes severe now with 3 weeks of worse headaches and visual changes with pulsatile tinnitus suggesting increase intracranial pressure.   3/2019 MRI brain shows high fluid signal around the optic nerve which can be seen in Pseudotumor cerebri. Her exam is consistent with papilledema as well. LP shows findings consistent with Pseudotumor cerebri  I recommend:     1.  Diamox at 500mg BID has not helped headaches and is causing fatigue/ low energy side effects and she stopped this med  2. Check CMP today  3. Try 20mg Lasix per orders  In addition to a 125mg BID Diamox (start Diamox now for 2 weeks, then start Lasix) to help reduce diamox side effects but to augment her diuresis. Add Lasix sooner if increased headaches  (she has a history of proteinuria evaluated by nephrology prior)  4. Will need further CMP monitoring on thes medication (at the next visit)  5. Continue to follow with ophthalmology as planned in October, but her eye exam is improved today  6. Weight loss urged to reduce  severe obesity which contributes to this condition and is the ultimate. She is exercising and needs to diet as well   RTC in 6 weeks and as scheduled with me in December

## 2019-07-23 ENCOUNTER — PATIENT MESSAGE (OUTPATIENT)
Dept: NEUROLOGY | Facility: CLINIC | Age: 39
End: 2019-07-23

## 2019-10-02 ENCOUNTER — OFFICE VISIT (OUTPATIENT)
Dept: NEUROLOGY | Facility: CLINIC | Age: 39
End: 2019-10-02
Payer: COMMERCIAL

## 2019-10-02 VITALS
BODY MASS INDEX: 38.57 KG/M2 | SYSTOLIC BLOOD PRESSURE: 112 MMHG | WEIGHT: 231.5 LBS | HEIGHT: 65 IN | DIASTOLIC BLOOD PRESSURE: 68 MMHG | HEART RATE: 82 BPM | RESPIRATION RATE: 18 BRPM | OXYGEN SATURATION: 97 %

## 2019-10-02 DIAGNOSIS — M54.81 OCCIPITAL NEURALGIA, UNSPECIFIED LATERALITY: ICD-10-CM

## 2019-10-02 DIAGNOSIS — E87.6 HYPOKALEMIA: ICD-10-CM

## 2019-10-02 DIAGNOSIS — Z79.899 ENCOUNTER FOR LONG-TERM CURRENT USE OF MEDICATION: ICD-10-CM

## 2019-10-02 DIAGNOSIS — M54.5 LOW BACK PAIN, UNSPECIFIED BACK PAIN LATERALITY, UNSPECIFIED CHRONICITY, WITH SCIATICA PRESENCE UNSPECIFIED: ICD-10-CM

## 2019-10-02 DIAGNOSIS — G93.2 PSEUDOTUMOR CEREBRI: Primary | ICD-10-CM

## 2019-10-02 DIAGNOSIS — M79.89 LEG SWELLING: ICD-10-CM

## 2019-10-02 PROBLEM — M54.50 LOW BACK PAIN: Status: ACTIVE | Noted: 2019-10-02

## 2019-10-02 PROCEDURE — 99999 PR PBB SHADOW E&M-EST. PATIENT-LVL V: ICD-10-PCS | Mod: PBBFAC,,, | Performed by: NURSE PRACTITIONER

## 2019-10-02 PROCEDURE — 3008F PR BODY MASS INDEX (BMI) DOCUMENTED: ICD-10-PCS | Mod: CPTII,S$GLB,, | Performed by: NURSE PRACTITIONER

## 2019-10-02 PROCEDURE — 99214 OFFICE O/P EST MOD 30 MIN: CPT | Mod: S$GLB,,, | Performed by: NURSE PRACTITIONER

## 2019-10-02 PROCEDURE — 3008F BODY MASS INDEX DOCD: CPT | Mod: CPTII,S$GLB,, | Performed by: NURSE PRACTITIONER

## 2019-10-02 PROCEDURE — 99214 PR OFFICE/OUTPT VISIT, EST, LEVL IV, 30-39 MIN: ICD-10-PCS | Mod: S$GLB,,, | Performed by: NURSE PRACTITIONER

## 2019-10-02 PROCEDURE — 99999 PR PBB SHADOW E&M-EST. PATIENT-LVL V: CPT | Mod: PBBFAC,,, | Performed by: NURSE PRACTITIONER

## 2019-10-02 NOTE — PROGRESS NOTES
HPI: Lacey Benz is a 39 y.o. female with 2 years of headaches, sometimes severe now with worse headaches and visual changes with pulsatile tinnitus suggesting increase intracranial pressure. 3/2019 MRI brain shows high fluid signal around the optic nerve which can be seen in Pseudotumor cerebri. Her exam is consistent with papilledema as well. LP shows findings consistent with Pseudotumor cerebri.     She presents today for a follow up visit. Her Diamox was reduced at her last visit, due to side effects/fatigue and Lasix was added. She continues with visual complaints/blurring, as well as headaches.     Headaches are located to the right occipital area and radiate frontally. They are stabbing in nature, and occur a few times every day. She does have associated neck pain. Blurred vision occurs regardless of headaches.     She reports to having a stabbing pain in her lower back, which is relieved by urinating. This began several months ago.     Ongoing BLE swelling. She reports excessive weight gain over the past couple years, which she was not able to lose, even doing Crossfit and a dietary program. She did have a hysterectomy three years ago, but still has her ovaries. PCP did check her TSH. She gained 6 pounds since her last visit.   Review of Systems   Constitutional: Negative for fever.   HENT: Negative for nosebleeds.    Eyes: Positive for blurred vision.   Respiratory: Negative for hemoptysis.    Cardiovascular: Positive for leg swelling.   Gastrointestinal: Negative for blood in stool.   Genitourinary: Negative for hematuria.   Musculoskeletal: Positive for neck pain. Negative for falls.   Skin: Negative for rash.   Neurological: Positive for headaches. Negative for dizziness.   Psychiatric/Behavioral: The patient does not have insomnia.        I have reviewed all of this patient's past medical and surgical histories as well as family and social histories and active allergies and medications as  documented in the electronic medical record.    Exam:  Gen Appearance, well developed/nourished in no apparent distress  CV: 2+ distal pulses with no edema or swelling  Neuro:  MS: Awake, alert, oriented to place, person, time, situation. Sustains attention. Recent/remote memory intact, Language is full to spontaneous speech/repetition/naming/comprehension. Fund of Knowledge is full  CN: Optic discs show no venous pulsations and mild papilledema which is improved today, PERRL, Extraoccular movements and visual fields are full. Normal facial sensation and strength, Hearing symmetric, Tongue and Palate are midline and strong. Shoulder Shrug symmetric and strong.  Motor: Normal bulk, tone, no abnormal movements. 5/5 strength bilateral upper/lower extremities with 2+ reflexes and no clonus  Sensory: symmetric to light touch, pain, temp, and vibration, Romberg negative  Cerebellar: Finger-nose,Heal-shin, Rapid alternating movements intact  Gait: Normal stance, no ataxia  MSK Survey: bilateral occipital tenderness, R>L.     Imaging:   MRI Brain 3/20/19: No mass, but empty sella and high signal fluid around the optic nerve.     4/2019 MRV: Normal MRV.    Labs:  3/2018 CMP,CBC reviewed  5/2019 CMP reviewed  7/2019 CMP showed mild hypokalemia    LP 2019:   The opening pressure was 44 cm of water. CSF labs and culture all normal    Assessment/Plan: Lacey Benz is a 39 y.o. female with 2 years of headaches, sometimes severe now with 3 weeks of worse headaches and visual changes with pulsatile tinnitus suggesting increase intracranial pressure. 3/2019 MRI brain shows high fluid signal around the optic nerve which can be seen in Pseudotumor cerebri. Her exam is consistent with papilledema as well. LP shows findings consistent with Pseudotumor cerebri.     I recommend:   1. Refer to Cardiology for BLE edema, not helped with Lasix. She has a history of proteinuria evaluated by nephrology prior-thought to be related to  diet at that time.    2. Recheck CMP, given Diamox/Lasix use at [facility]. Mild hypokalemia noted on last labs.   3. Check a KUB to evaluate for renal stones at [facility].   4. Please see Ob/Gyn to evaluate for hormonal causes of weight gain. Weight loss is needed to improve her pseudotumor. She is attempting to exercise, without results.   5. Continue low dose Diamox 125 mg bid and Lasix 20 mg for now.   6. Please expedite updated eye exam with Ophthalmology to this month to formally evaluate for changes to her papilledema. Consider changes to current meds based on results. Zonegran may be an option, if she doesn't have renal stones. No sulfa allergy.   7. She does have occipital neuralgia on exam, which may be the cause of her headaches, independent of her pseudotumor. Order ONB for next week.     FU 6 weeks then with Dr. Patrick in 12/2019

## 2019-10-02 NOTE — PATIENT INSTRUCTIONS
Please try to have your eye exam moved up to this month, and fax a copy of your exam to this office.     Please see Ob/Gyn to evaluate for hormonal causes of weight gain.

## 2019-10-08 ENCOUNTER — HOSPITAL ENCOUNTER (OUTPATIENT)
Dept: RADIOLOGY | Facility: HOSPITAL | Age: 39
Discharge: HOME | End: 2019-10-08
Attending: NURSE PRACTITIONER
Payer: COMMERCIAL

## 2019-10-08 ENCOUNTER — PROCEDURE VISIT (OUTPATIENT)
Dept: NEUROLOGY | Facility: CLINIC | Age: 39
End: 2019-10-08
Payer: COMMERCIAL

## 2019-10-08 DIAGNOSIS — M54.81 OCCIPITAL NEURALGIA, UNSPECIFIED LATERALITY: ICD-10-CM

## 2019-10-08 DIAGNOSIS — M54.5 LOW BACK PAIN, UNSPECIFIED BACK PAIN LATERALITY, UNSPECIFIED CHRONICITY, WITH SCIATICA PRESENCE UNSPECIFIED: ICD-10-CM

## 2019-10-08 PROCEDURE — 64405 PR NERVE BLOCK INJ, ANES/STEROID, OCCIPITAL: ICD-10-PCS | Mod: LT,S$GLB,, | Performed by: NURSE PRACTITIONER

## 2019-10-08 PROCEDURE — 64405 NJX AA&/STRD GR OCPL NRV: CPT | Mod: LT,S$GLB,, | Performed by: NURSE PRACTITIONER

## 2019-10-08 PROCEDURE — 74018 XR KUB: ICD-10-PCS | Mod: 26,,, | Performed by: RADIOLOGY

## 2019-10-08 PROCEDURE — 74018 RADEX ABDOMEN 1 VIEW: CPT | Mod: TC

## 2019-10-08 PROCEDURE — 74018 RADEX ABDOMEN 1 VIEW: CPT | Mod: 26,,, | Performed by: RADIOLOGY

## 2019-10-17 ENCOUNTER — TELEPHONE (OUTPATIENT)
Dept: NEUROLOGY | Facility: CLINIC | Age: 39
End: 2019-10-17

## 2019-10-17 RX ORDER — ZONISAMIDE 25 MG/1
100 CAPSULE ORAL NIGHTLY
Qty: 120 CAPSULE | Refills: 1 | Status: SHIPPED | OUTPATIENT
Start: 2019-10-17 | End: 2020-01-28 | Stop reason: DRUGHIGH

## 2019-10-17 NOTE — TELEPHONE ENCOUNTER
Her papilledema is improved per her recent eye exam, so I do not believe that this is the cause of her increased headaches.     Did the ONB help her headaches?

## 2019-10-17 NOTE — TELEPHONE ENCOUNTER
Let's add Zonegran for headache prevention per orders.     She will increase the medication each week over 4 weeks (instructions on Rx). If at any point her headaches resolve, she may stop increasing at that dose.     Keep a headache diary until her visit with me.

## 2019-10-17 NOTE — TELEPHONE ENCOUNTER
Notified patient, voiced understanding. She states that the ONB did help but the headaches are still there.

## 2019-12-16 ENCOUNTER — OFFICE VISIT (OUTPATIENT)
Dept: OBSTETRICS AND GYNECOLOGY | Facility: CLINIC | Age: 39
End: 2019-12-16
Payer: COMMERCIAL

## 2019-12-16 VITALS
HEART RATE: 82 BPM | BODY MASS INDEX: 38.95 KG/M2 | HEIGHT: 65 IN | SYSTOLIC BLOOD PRESSURE: 116 MMHG | WEIGHT: 233.81 LBS | RESPIRATION RATE: 18 BRPM | DIASTOLIC BLOOD PRESSURE: 74 MMHG

## 2019-12-16 DIAGNOSIS — N89.8 VAGINAL DISCHARGE: Primary | ICD-10-CM

## 2019-12-16 DIAGNOSIS — R35.0 FREQUENCY OF URINATION: ICD-10-CM

## 2019-12-16 LAB
BILIRUB SERPL-MCNC: NEGATIVE MG/DL
BLOOD URINE, POC: NEGATIVE
COLOR, POC UA: NORMAL
GLUCOSE UR QL STRIP: NORMAL
KETONES UR QL STRIP: NEGATIVE
LEUKOCYTE ESTERASE URINE, POC: NEGATIVE
NITRITE, POC UA: NEGATIVE
PH, POC UA: 5
PROTEIN, POC: 1
SPECIFIC GRAVITY, POC UA: 1.02
UROBILINOGEN, POC UA: NORMAL

## 2019-12-16 PROCEDURE — 3008F PR BODY MASS INDEX (BMI) DOCUMENTED: ICD-10-PCS | Mod: CPTII,S$GLB,, | Performed by: ADVANCED PRACTICE MIDWIFE

## 2019-12-16 PROCEDURE — 99214 OFFICE O/P EST MOD 30 MIN: CPT | Mod: 25,S$GLB,, | Performed by: ADVANCED PRACTICE MIDWIFE

## 2019-12-16 PROCEDURE — 87481 CANDIDA DNA AMP PROBE: CPT | Mod: 59

## 2019-12-16 PROCEDURE — 81002 URINALYSIS NONAUTO W/O SCOPE: CPT | Mod: S$GLB,,, | Performed by: ADVANCED PRACTICE MIDWIFE

## 2019-12-16 PROCEDURE — 87801 DETECT AGNT MULT DNA AMPLI: CPT

## 2019-12-16 PROCEDURE — 99999 PR PBB SHADOW E&M-EST. PATIENT-LVL III: ICD-10-PCS | Mod: PBBFAC,,, | Performed by: ADVANCED PRACTICE MIDWIFE

## 2019-12-16 PROCEDURE — 87661 TRICHOMONAS VAGINALIS AMPLIF: CPT

## 2019-12-16 PROCEDURE — 87491 CHLMYD TRACH DNA AMP PROBE: CPT

## 2019-12-16 PROCEDURE — 81002 POCT URINE DIPSTICK WITHOUT MICROSCOPE: ICD-10-PCS | Mod: S$GLB,,, | Performed by: ADVANCED PRACTICE MIDWIFE

## 2019-12-16 PROCEDURE — 99214 PR OFFICE/OUTPT VISIT, EST, LEVL IV, 30-39 MIN: ICD-10-PCS | Mod: 25,S$GLB,, | Performed by: ADVANCED PRACTICE MIDWIFE

## 2019-12-16 PROCEDURE — 3008F BODY MASS INDEX DOCD: CPT | Mod: CPTII,S$GLB,, | Performed by: ADVANCED PRACTICE MIDWIFE

## 2019-12-16 PROCEDURE — 87086 URINE CULTURE/COLONY COUNT: CPT

## 2019-12-16 PROCEDURE — 99999 PR PBB SHADOW E&M-EST. PATIENT-LVL III: CPT | Mod: PBBFAC,,, | Performed by: ADVANCED PRACTICE MIDWIFE

## 2019-12-16 RX ORDER — ATORVASTATIN CALCIUM 40 MG/1
40 TABLET, FILM COATED ORAL NIGHTLY
Refills: 6 | COMMUNITY
Start: 2019-11-04 | End: 2020-09-29

## 2019-12-16 NOTE — PROGRESS NOTES
Subjective:    Patient ID: Lacey Benz is a 39 y.o. y.o. female.     Chief Complaint:   Chief Complaint   Patient presents with    Vaginal Discharge     odor       History of Present Illness:  Lacey presents today complaining of urinary frequency, vaginal discharge and vaginal odor. Symptoms have been present for 2 months and have been gradually worsening. She also denies : abdominal pain, back pain, chills and dysuria.  She does complain of vaginal discharge.  She is sexually active..        ROS:   Review of Systems   Genitourinary: Positive for frequency, vaginal discharge and vaginal odor. Negative for pelvic pain and vaginal pain.   All other systems reviewed and are negative.          Objective:    Vital Signs:  Vitals:    12/16/19 1214   BP: 116/74   Pulse: 82   Resp: 18       Physical Exam:  General:  alert, cooperative, no distress   Head Normocephalic, without obvious abnormality, atraumatic   Neck .supple, symmetrical, trachea midline   Skin:  Skin color, texture, turgor normal. No rashes or lesions   Abdomen:  soft, non-tender; bowel sounds normal   Pelvis: Normal , no dc noted    Extremities extremities normal, atraumatic, no cyanosis or edema   Neurologic Grossly normal          Urine dipstick shows negative for all components.       Assessment:      1. Vaginal discharge    2. Frequency of urination          Plan:      PLAN:   1. Treatment per orders - Gen probe, Urine culture    2.  Encouraged increased po fluid intake  3.  Follow up culture for sensitivities  4. Call or return to clinic prn if these symptoms worsen or fail to improve as anticipated.

## 2019-12-17 LAB
BACTERIAL VAGINOSIS DNA: NEGATIVE
C TRACH DNA SPEC QL NAA+PROBE: NOT DETECTED
CANDIDA GLABRATA DNA: NEGATIVE
CANDIDA KRUSEI DNA: NEGATIVE
CANDIDA RRNA VAG QL PROBE: NEGATIVE
N GONORRHOEA DNA SPEC QL NAA+PROBE: NOT DETECTED
T VAGINALIS RRNA GENITAL QL PROBE: NEGATIVE

## 2019-12-18 LAB — BACTERIA UR CULT: NORMAL

## 2020-01-28 ENCOUNTER — OFFICE VISIT (OUTPATIENT)
Dept: NEUROLOGY | Facility: CLINIC | Age: 40
End: 2020-01-28
Payer: COMMERCIAL

## 2020-01-28 VITALS
BODY MASS INDEX: 39.67 KG/M2 | RESPIRATION RATE: 18 BRPM | SYSTOLIC BLOOD PRESSURE: 118 MMHG | HEART RATE: 76 BPM | WEIGHT: 238.13 LBS | HEIGHT: 65 IN | DIASTOLIC BLOOD PRESSURE: 80 MMHG

## 2020-01-28 DIAGNOSIS — M54.81 OCCIPITAL NEURALGIA, UNSPECIFIED LATERALITY: ICD-10-CM

## 2020-01-28 DIAGNOSIS — G93.2 PSEUDOTUMOR CEREBRI: Primary | ICD-10-CM

## 2020-01-28 DIAGNOSIS — E66.01 CLASS 2 SEVERE OBESITY WITH SERIOUS COMORBIDITY AND BODY MASS INDEX (BMI) OF 39.0 TO 39.9 IN ADULT, UNSPECIFIED OBESITY TYPE: ICD-10-CM

## 2020-01-28 PROBLEM — Z79.899 ENCOUNTER FOR LONG-TERM CURRENT USE OF MEDICATION: Status: RESOLVED | Noted: 2019-10-02 | Resolved: 2020-01-28

## 2020-01-28 PROBLEM — E66.9 OBESITY: Status: ACTIVE | Noted: 2020-01-28

## 2020-01-28 PROCEDURE — 3008F BODY MASS INDEX DOCD: CPT | Mod: CPTII,S$GLB,, | Performed by: NURSE PRACTITIONER

## 2020-01-28 PROCEDURE — 3008F PR BODY MASS INDEX (BMI) DOCUMENTED: ICD-10-PCS | Mod: CPTII,S$GLB,, | Performed by: NURSE PRACTITIONER

## 2020-01-28 PROCEDURE — 99999 PR PBB SHADOW E&M-EST. PATIENT-LVL III: CPT | Mod: PBBFAC,,, | Performed by: NURSE PRACTITIONER

## 2020-01-28 PROCEDURE — 99214 OFFICE O/P EST MOD 30 MIN: CPT | Mod: S$GLB,,, | Performed by: NURSE PRACTITIONER

## 2020-01-28 PROCEDURE — 99214 PR OFFICE/OUTPT VISIT, EST, LEVL IV, 30-39 MIN: ICD-10-PCS | Mod: S$GLB,,, | Performed by: NURSE PRACTITIONER

## 2020-01-28 PROCEDURE — 99999 PR PBB SHADOW E&M-EST. PATIENT-LVL III: ICD-10-PCS | Mod: PBBFAC,,, | Performed by: NURSE PRACTITIONER

## 2020-01-28 RX ORDER — ZONISAMIDE 100 MG/1
100 CAPSULE ORAL NIGHTLY
COMMUNITY
End: 2020-02-03 | Stop reason: SDUPTHER

## 2020-01-28 NOTE — PROGRESS NOTES
HPI: Lacey Benz is a 39 y.o. female with 2 years of headaches, sometimes severe now with worse headaches and visual changes with pulsatile tinnitus suggesting increase intracranial pressure. 3/2019 MRI brain shows high fluid signal around the optic nerve which can be seen in Pseudotumor cerebri. Her exam is consistent with papilledema as well. LP shows findings consistent with Pseudotumor cerebri.     She presents today for a follow up visit. ONB's given at her last visit for occipital headaches, which were effective but only lasted for a couple days, and Zonegran was added.     10/2019 eye exam showed improved papilledema. KUB done at last visit was unrevealing for renal stones. She is no longer taking Diamox, as her headaches were worse on this. Diamox reduced prior, due to side effects/fatigue, and Lasix was added.     NO visual complaints at this time.     She had an impacted wisdom tooth removed, and had improvement to her right sided headaches with this.     She gained 7 lbs since her last visit. She is unable to lose weight per patient and does not know why she is gaining weight.     She saw Cardiology for her BLE swelling. Workup was overall unremarkable per Cardiology. BLE swelling is improved. She was placed on a statin for HLD.     Review of Systems   Constitutional: Negative for fever.   HENT: Negative for nosebleeds.    Eyes: Positive for blurred vision.   Respiratory: Negative for hemoptysis.    Cardiovascular: Positive for leg swelling.   Gastrointestinal: Negative for blood in stool.   Genitourinary: Negative for hematuria.   Musculoskeletal: Positive for neck pain. Negative for falls.   Skin: Negative for rash.   Neurological: Positive for headaches. Negative for dizziness.   Psychiatric/Behavioral: The patient does not have insomnia.        I have reviewed all of this patient's past medical and surgical histories as well as family and social histories and active allergies and  medications as documented in the electronic medical record.    Exam:  Gen Appearance, well developed/nourished in no apparent distress  CV: 2+ distal pulses with no edema or swelling  Neuro:  MS: Awake, alert, oriented to place, person, time, situation. Sustains attention. Recent/remote memory intact, Language is full to spontaneous speech/repetition/naming/comprehension. Fund of Knowledge is full  CN: Optic discs show no venous pulsations and mild papilledema which is improved today, PERRL, Extraoccular movements and visual fields are full. Normal facial sensation and strength, Hearing symmetric, Tongue and Palate are midline and strong. Shoulder Shrug symmetric and strong.  Motor: Normal bulk, tone, no abnormal movements. 5/5 strength bilateral upper/lower extremities with 2+ reflexes and no clonus  Sensory: symmetric to light touch, pain, temp, and vibration, Romberg negative  Cerebellar: Finger-nose,Heal-shin, Rapid alternating movements intact  Gait: Normal stance, no ataxia  MSK Survey: bilateral occipital tenderness, R>L.     Imaging:   MRI Brain 3/20/19: No mass, but empty sella and high signal fluid around the optic nerve.     4/2019 MRV: Normal MRV.    Labs:  3/2018 CMP,CBC reviewed  5/2019 CMP reviewed  7/2019 CMP showed mild hypokalemia  10/2019 CMP normal    LP 2019:   The opening pressure was 44 cm of water. CSF labs and culture all normal    Assessment/Plan: Lacey Benz is a 39 y.o. female with 2 years of headaches, sometimes severe now with 3 weeks of worse headaches and visual changes with pulsatile tinnitus suggesting increase intracranial pressure. 3/2019 MRI brain shows high fluid signal around the optic nerve which can be seen in Pseudotumor cerebri. Her exam is consistent with papilledema as well. LP shows findings consistent with Pseudotumor cerebri.     I recommend:   1. She is off of Diamox now, due to complaint of worsening headaches with 7 lb. Weight gain since last visit.    -No visual complaints, but obtain note from upcoming eye exam visit.   2. Weight loss is needed to improve condition. Weight gain of 7 lbs. Noted since last visit. Zonegran may help with weight, but has not made an impact thus far  3. Continue Lasix for pseudotumor and Zonegran for headache prevention and pseudotumor.   4. She has a history of proteinuria evaluated by nephrology prior-thought to be related to diet at that time.    -Saw Cardiology for evaluation of BLE edema prior.   5. Recheck CMP at next visit, given Lasix use.   6. Short term relief with ONB's prior. She had occipital neuralgia, independent from her pseudotumor prior-now resolved.     FU 3 months    To ER with any visual loss  Call with worsening headache/visual complaints

## 2020-02-03 NOTE — TELEPHONE ENCOUNTER
----- Message from Maggie Lebron sent at 2/3/2020 10:47 AM CST -----  Contact: YOSEF ROMO [[id number]]      MEDICATION  REFILL  REQUEST      Please refill the medication(s) listed below. Please call the patient when the prescription(s) is ready for  at this phone number   [phone]      Medication #1 zonisamide (ZONEGRAN) 100 MG Cap  //  90 day supply          Preferred Pharmacy: [facility] - [place], [address]     [phone] (Phone)  [phone] (Fax)

## 2020-02-05 RX ORDER — ZONISAMIDE 100 MG/1
100 CAPSULE ORAL NIGHTLY
Qty: 30 CAPSULE | Refills: 3 | Status: SHIPPED | OUTPATIENT
Start: 2020-02-05 | End: 2020-06-10 | Stop reason: SDUPTHER

## 2020-02-06 ENCOUNTER — LAB VISIT (OUTPATIENT)
Dept: LAB | Facility: HOSPITAL | Age: 40
End: 2020-02-06
Attending: OPHTHALMOLOGY
Payer: COMMERCIAL

## 2020-02-06 ENCOUNTER — OFFICE VISIT (OUTPATIENT)
Dept: NEUROLOGY | Facility: CLINIC | Age: 40
End: 2020-02-06
Payer: COMMERCIAL

## 2020-02-06 VITALS
HEIGHT: 65 IN | DIASTOLIC BLOOD PRESSURE: 84 MMHG | HEART RATE: 74 BPM | RESPIRATION RATE: 18 BRPM | BODY MASS INDEX: 39.25 KG/M2 | WEIGHT: 235.56 LBS | SYSTOLIC BLOOD PRESSURE: 132 MMHG

## 2020-02-06 DIAGNOSIS — H47.10 PAPILLEDEMA: ICD-10-CM

## 2020-02-06 DIAGNOSIS — Z79.899 LONG-TERM USE OF HIGH-RISK MEDICATION: ICD-10-CM

## 2020-02-06 DIAGNOSIS — Z79.899 LONG-TERM USE OF HIGH-RISK MEDICATION: Primary | ICD-10-CM

## 2020-02-06 DIAGNOSIS — G93.2 PSEUDOTUMOR CEREBRI: Primary | ICD-10-CM

## 2020-02-06 DIAGNOSIS — G93.2 PSEUDOTUMOR CEREBRI: ICD-10-CM

## 2020-02-06 LAB
ALBUMIN SERPL BCP-MCNC: 4 G/DL (ref 3.5–5.2)
ALBUMIN SERPL BCP-MCNC: 4 G/DL (ref 3.5–5.2)
ALP SERPL-CCNC: 89 U/L (ref 55–135)
ALP SERPL-CCNC: 89 U/L (ref 55–135)
ALT SERPL W/O P-5'-P-CCNC: 26 U/L (ref 10–44)
ALT SERPL W/O P-5'-P-CCNC: 26 U/L (ref 10–44)
ANION GAP SERPL CALC-SCNC: 8 MMOL/L (ref 8–16)
ANION GAP SERPL CALC-SCNC: 8 MMOL/L (ref 8–16)
AST SERPL-CCNC: 16 U/L (ref 10–40)
AST SERPL-CCNC: 16 U/L (ref 10–40)
BILIRUB SERPL-MCNC: 0.5 MG/DL (ref 0.1–1)
BILIRUB SERPL-MCNC: 0.5 MG/DL (ref 0.1–1)
BUN SERPL-MCNC: 10 MG/DL (ref 6–20)
BUN SERPL-MCNC: 10 MG/DL (ref 6–20)
CALCIUM SERPL-MCNC: 9.4 MG/DL (ref 8.7–10.5)
CALCIUM SERPL-MCNC: 9.4 MG/DL (ref 8.7–10.5)
CHLORIDE SERPL-SCNC: 106 MMOL/L (ref 95–110)
CHLORIDE SERPL-SCNC: 106 MMOL/L (ref 95–110)
CO2 SERPL-SCNC: 27 MMOL/L (ref 23–29)
CO2 SERPL-SCNC: 27 MMOL/L (ref 23–29)
CREAT SERPL-MCNC: 0.9 MG/DL (ref 0.5–1.4)
CREAT SERPL-MCNC: 0.9 MG/DL (ref 0.5–1.4)
ERYTHROCYTE [DISTWIDTH] IN BLOOD BY AUTOMATED COUNT: 12.6 % (ref 11.5–14.5)
EST. GFR  (AFRICAN AMERICAN): >60 ML/MIN/1.73 M^2
EST. GFR  (AFRICAN AMERICAN): >60 ML/MIN/1.73 M^2
EST. GFR  (NON AFRICAN AMERICAN): >60 ML/MIN/1.73 M^2
EST. GFR  (NON AFRICAN AMERICAN): >60 ML/MIN/1.73 M^2
GLUCOSE SERPL-MCNC: 102 MG/DL (ref 70–110)
GLUCOSE SERPL-MCNC: 102 MG/DL (ref 70–110)
HCT VFR BLD AUTO: 45.1 % (ref 37–48.5)
HGB BLD-MCNC: 14.6 G/DL (ref 12–16)
MCH RBC QN AUTO: 30.1 PG (ref 27–31)
MCHC RBC AUTO-ENTMCNC: 32.4 G/DL (ref 32–36)
MCV RBC AUTO: 93 FL (ref 82–98)
PLATELET # BLD AUTO: 247 K/UL (ref 150–350)
PMV BLD AUTO: 10.1 FL (ref 9.2–12.9)
POTASSIUM SERPL-SCNC: 4.1 MMOL/L (ref 3.5–5.1)
POTASSIUM SERPL-SCNC: 4.1 MMOL/L (ref 3.5–5.1)
PROT SERPL-MCNC: 7.1 G/DL (ref 6–8.4)
PROT SERPL-MCNC: 7.1 G/DL (ref 6–8.4)
RBC # BLD AUTO: 4.85 M/UL (ref 4–5.4)
SODIUM SERPL-SCNC: 141 MMOL/L (ref 136–145)
SODIUM SERPL-SCNC: 141 MMOL/L (ref 136–145)
T3 SERPL-MCNC: 93 NG/DL (ref 60–180)
T4 FREE SERPL-MCNC: 0.86 NG/DL (ref 0.71–1.51)
TSH SERPL DL<=0.005 MIU/L-ACNC: 1.34 UIU/ML (ref 0.4–4)
WBC # BLD AUTO: 9.06 K/UL (ref 3.9–12.7)

## 2020-02-06 PROCEDURE — 84443 ASSAY THYROID STIM HORMONE: CPT

## 2020-02-06 PROCEDURE — 3008F BODY MASS INDEX DOCD: CPT | Mod: CPTII,S$GLB,, | Performed by: NURSE PRACTITIONER

## 2020-02-06 PROCEDURE — 99215 OFFICE O/P EST HI 40 MIN: CPT | Mod: S$GLB,,, | Performed by: NURSE PRACTITIONER

## 2020-02-06 PROCEDURE — 99215 PR OFFICE/OUTPT VISIT, EST, LEVL V, 40-54 MIN: ICD-10-PCS | Mod: S$GLB,,, | Performed by: NURSE PRACTITIONER

## 2020-02-06 PROCEDURE — 84480 ASSAY TRIIODOTHYRONINE (T3): CPT

## 2020-02-06 PROCEDURE — 99999 PR PBB SHADOW E&M-EST. PATIENT-LVL IV: CPT | Mod: PBBFAC,,, | Performed by: NURSE PRACTITIONER

## 2020-02-06 PROCEDURE — 83520 IMMUNOASSAY QUANT NOS NONAB: CPT

## 2020-02-06 PROCEDURE — 3008F PR BODY MASS INDEX (BMI) DOCUMENTED: ICD-10-PCS | Mod: CPTII,S$GLB,, | Performed by: NURSE PRACTITIONER

## 2020-02-06 PROCEDURE — 84439 ASSAY OF FREE THYROXINE: CPT

## 2020-02-06 PROCEDURE — 80053 COMPREHEN METABOLIC PANEL: CPT

## 2020-02-06 PROCEDURE — 85027 COMPLETE CBC AUTOMATED: CPT

## 2020-02-06 PROCEDURE — 99999 PR PBB SHADOW E&M-EST. PATIENT-LVL IV: ICD-10-PCS | Mod: PBBFAC,,, | Performed by: NURSE PRACTITIONER

## 2020-02-06 PROCEDURE — 36415 COLL VENOUS BLD VENIPUNCTURE: CPT

## 2020-02-06 RX ORDER — FUROSEMIDE 40 MG/1
40 TABLET ORAL DAILY
Qty: 30 TABLET | Refills: 2 | Status: SHIPPED | OUTPATIENT
Start: 2020-02-06 | End: 2020-05-20 | Stop reason: SDUPTHER

## 2020-02-06 NOTE — PATIENT INSTRUCTIONS
Obtain disc of MRI Brain from [facility], and bring with you to [facility] for repeat MRI Brain.     Call clinic with any muscle cramping, which could be due to low potassium.

## 2020-02-06 NOTE — PROGRESS NOTES
HPI: Lacey Benz is a 39 y.o. female with 2 years of headaches, sometimes severe now with worse headaches and visual changes with pulsatile tinnitus suggesting increase intracranial pressure. 3/2019 MRI brain shows high fluid signal around the optic nerve which can be seen in Pseudotumor cerebri. Her exam is consistent with papilledema as well. LP shows findings consistent with Pseudotumor cerebri.     She was referred back to clinic today per Ophthalmology, after her updated eye exam showed worsening papilledema, after she discontinue Diamox, due to poor tolerance and worsening of her headaches. Her Ophthalmologist requested that her MRI Brain be repeated to include Orbits with contrast, as there is some concern of posterior scleritis vs posterior compression.     She continues on Lasix 20 mg for pseudotumor, and Zonegran for headache prevention, which remains fully effective. No headaches currently, but vision is blurred. It is unclear when this became noticeable, as she stays so busy, she didn't notice, but she did not report visual complaints at her visit with me on 1/28/2019.     She lost 3 lbs. Since her last visit 1 week ago, but prior to this she gained 7 lbs. Around the holiday season. She is unable to lose weight per patient and does not know why she is gaining weight.     She saw Cardiology for her BLE swelling prior. Workup was overall unremarkable per Cardiology. BLE swelling is improved. She was placed on a statin for HLD.     Review of Systems   Constitutional: Negative for fever.   HENT: Negative for nosebleeds.    Eyes: Positive for blurred vision.   Respiratory: Negative for hemoptysis.    Cardiovascular: Positive for leg swelling.   Gastrointestinal: Negative for blood in stool.   Genitourinary: Negative for hematuria.   Musculoskeletal: Negative for falls and neck pain.   Skin: Negative for rash.   Neurological: Negative for dizziness and headaches.   Psychiatric/Behavioral: The  patient does not have insomnia.        I have reviewed all of this patient's past medical and surgical histories as well as family and social histories and active allergies and medications as documented in the electronic medical record.    Exam:  Gen Appearance, well developed/nourished in no apparent distress  CV: 2+ distal pulses with no edema or swelling  Neuro:  MS: Awake, alert, oriented to place, person, time, situation. Sustains attention. Recent/remote memory intact, Language is full to spontaneous speech/repetition/naming/comprehension. Fund of Knowledge is full  CN: Optic discs show no venous pulsations and papilledema, PERRL, Extraoccular movements and visual fields are full. Normal facial sensation and strength, Hearing symmetric, Tongue and Palate are midline and strong. Shoulder Shrug symmetric and strong.  Motor: Normal bulk, tone, no abnormal movements. 5/5 strength bilateral upper/lower extremities with 2+ reflexes and no clonus  Sensory: symmetric to light touch, pain, temp, and vibration, Romberg negative  Cerebellar: Finger-nose,Heal-shin, Rapid alternating movements intact  Gait: Normal stance, no ataxia  MSK Survey: bilateral occipital tenderness, R>L.     Imaging:   MRI Brain 3/20/19:   No mass, but empty sella and high signal fluid around the optic nerve.     4/2019 MRV: Normal MRV.    Labs:  3/2018 CMP,CBC reviewed  5/2019 CMP reviewed  7/2019 CMP showed mild hypokalemia  10/2019 CMP normal    LP 2019:   The opening pressure was 44 cm of water. CSF labs and culture all normal.     Assessment/Plan: Lacey Benz is a 39 y.o. female with 2 years of headaches, sometimes severe now with 3 weeks of worse headaches and visual changes with pulsatile tinnitus suggesting increase intracranial pressure. 3/2019 MRI brain shows high fluid signal around the optic nerve which can be seen in Pseudotumor cerebri. Her exam is consistent with papilledema as well. LP shows findings consistent with  Pseudotumor cerebri.     I recommend:   1. Increase Lasix to 40 mg bid. Notify clinic with any muscle cramping, which is a side effect of low potassium, which can occur on this medication.   -Diamox was poorly tolerated prior, and she has had worsening of papilledema off of this  2. Refer to Neuro-Ophthalmology.   3. She is scheduled for Graves, Thyroid Antibodies. Add CMP and review results from Ophthalmology labs.   4. Obtain actual MRI Brain report from [facility] and have patient obtain disc for comparison to new MRI Brain to include Orbits. The report was reviewed at some point around the time of her initial visit, but it is not in media to refer to.   5. Repeat MRI Brain to include Orbits, given differential diagnosis if posterior compression vs. Posterior scleritis; however, she did have significantly elevated opening pressure on LP, which argues for pseudotumor.   6. Should she fail to respond to Lasix, and her eye exam per Ophthalmology continues to support pseudotumor vs posterior scleritis,  Shunt placement would need to be considered. She is also on Zonegran, which can help pseudotumor.   7. She has a history of proteinuria evaluated by nephrology prior-thought to be related to diet at that time.    -Saw Cardiology for evaluation of BLE edema prior.   8. Continue Zonegran for headache prevention.   -Short term relief with ONB's prior. She had occipital neuralgia, independent from her pseudotumor prior-now resolved.     FU 4 weeks    To ER with any visual loss/acute visual changes; verbalized understanding  Call with worsening headache/visual complaints

## 2020-02-07 ENCOUNTER — PATIENT MESSAGE (OUTPATIENT)
Dept: NEUROLOGY | Facility: CLINIC | Age: 40
End: 2020-02-07

## 2020-02-08 LAB — TSH RECEP AB SER-ACNC: <1 IU/L (ref 0–1.75)

## 2020-02-11 ENCOUNTER — PATIENT MESSAGE (OUTPATIENT)
Dept: NEUROLOGY | Facility: CLINIC | Age: 40
End: 2020-02-11

## 2020-02-13 ENCOUNTER — HOSPITAL ENCOUNTER (OUTPATIENT)
Dept: RADIOLOGY | Facility: HOSPITAL | Age: 40
Discharge: HOME | End: 2020-02-13
Attending: NURSE PRACTITIONER
Payer: COMMERCIAL

## 2020-02-13 DIAGNOSIS — G93.2 PSEUDOTUMOR CEREBRI: ICD-10-CM

## 2020-02-13 PROCEDURE — 70553 MRI BRAIN STEM W/O & W/DYE: CPT | Mod: TC

## 2020-02-13 PROCEDURE — A9585 GADOBUTROL INJECTION: HCPCS | Performed by: NURSE PRACTITIONER

## 2020-02-13 PROCEDURE — 25500020 PHARM REV CODE 255: Performed by: NURSE PRACTITIONER

## 2020-02-13 RX ORDER — GADOBUTROL 604.72 MG/ML
10 INJECTION INTRAVENOUS
Status: COMPLETED | OUTPATIENT
Start: 2020-02-13 | End: 2020-02-13

## 2020-02-13 RX ADMIN — GADOBUTROL 10 ML: 604.72 INJECTION INTRAVENOUS at 09:02

## 2020-03-11 ENCOUNTER — LAB VISIT (OUTPATIENT)
Dept: LAB | Facility: HOSPITAL | Age: 40
End: 2020-03-11
Attending: NURSE PRACTITIONER
Payer: COMMERCIAL

## 2020-03-11 ENCOUNTER — OFFICE VISIT (OUTPATIENT)
Dept: NEUROLOGY | Facility: CLINIC | Age: 40
End: 2020-03-11
Payer: COMMERCIAL

## 2020-03-11 VITALS
RESPIRATION RATE: 16 BRPM | WEIGHT: 238.13 LBS | SYSTOLIC BLOOD PRESSURE: 126 MMHG | HEIGHT: 66 IN | BODY MASS INDEX: 38.27 KG/M2 | HEART RATE: 80 BPM | DIASTOLIC BLOOD PRESSURE: 82 MMHG

## 2020-03-11 DIAGNOSIS — E66.01 CLASS 2 SEVERE OBESITY WITH SERIOUS COMORBIDITY AND BODY MASS INDEX (BMI) OF 39.0 TO 39.9 IN ADULT, UNSPECIFIED OBESITY TYPE: ICD-10-CM

## 2020-03-11 DIAGNOSIS — R53.83 FATIGUE, UNSPECIFIED TYPE: ICD-10-CM

## 2020-03-11 DIAGNOSIS — M54.81 OCCIPITAL NEURALGIA, UNSPECIFIED LATERALITY: ICD-10-CM

## 2020-03-11 DIAGNOSIS — F43.9 STRESS: ICD-10-CM

## 2020-03-11 DIAGNOSIS — G93.2 PSEUDOTUMOR CEREBRI: ICD-10-CM

## 2020-03-11 DIAGNOSIS — H47.10 PAPILLEDEMA: Primary | ICD-10-CM

## 2020-03-11 LAB
25(OH)D3+25(OH)D2 SERPL-MCNC: 14 NG/ML (ref 30–96)
ALBUMIN SERPL BCP-MCNC: 3.7 G/DL (ref 3.5–5.2)
ALP SERPL-CCNC: 80 U/L (ref 55–135)
ALT SERPL W/O P-5'-P-CCNC: 29 U/L (ref 10–44)
ANION GAP SERPL CALC-SCNC: 8 MMOL/L (ref 8–16)
AST SERPL-CCNC: 15 U/L (ref 10–40)
BILIRUB SERPL-MCNC: 0.3 MG/DL (ref 0.1–1)
BUN SERPL-MCNC: 9 MG/DL (ref 6–20)
CALCIUM SERPL-MCNC: 9.2 MG/DL (ref 8.7–10.5)
CHLORIDE SERPL-SCNC: 105 MMOL/L (ref 95–110)
CO2 SERPL-SCNC: 29 MMOL/L (ref 23–29)
CREAT SERPL-MCNC: 0.8 MG/DL (ref 0.5–1.4)
EST. GFR  (AFRICAN AMERICAN): >60 ML/MIN/1.73 M^2
EST. GFR  (NON AFRICAN AMERICAN): >60 ML/MIN/1.73 M^2
GLUCOSE SERPL-MCNC: 82 MG/DL (ref 70–110)
POTASSIUM SERPL-SCNC: 3.9 MMOL/L (ref 3.5–5.1)
PROT SERPL-MCNC: 6.9 G/DL (ref 6–8.4)
SODIUM SERPL-SCNC: 142 MMOL/L (ref 136–145)
VIT B12 SERPL-MCNC: 281 PG/ML (ref 210–950)

## 2020-03-11 PROCEDURE — 3008F BODY MASS INDEX DOCD: CPT | Mod: CPTII,S$GLB,, | Performed by: NURSE PRACTITIONER

## 2020-03-11 PROCEDURE — 99999 PR PBB SHADOW E&M-EST. PATIENT-LVL IV: CPT | Mod: PBBFAC,,, | Performed by: NURSE PRACTITIONER

## 2020-03-11 PROCEDURE — 82306 VITAMIN D 25 HYDROXY: CPT

## 2020-03-11 PROCEDURE — 82607 VITAMIN B-12: CPT

## 2020-03-11 PROCEDURE — 3008F PR BODY MASS INDEX (BMI) DOCUMENTED: ICD-10-PCS | Mod: CPTII,S$GLB,, | Performed by: NURSE PRACTITIONER

## 2020-03-11 PROCEDURE — 36415 COLL VENOUS BLD VENIPUNCTURE: CPT

## 2020-03-11 PROCEDURE — 99214 PR OFFICE/OUTPT VISIT, EST, LEVL IV, 30-39 MIN: ICD-10-PCS | Mod: S$GLB,,, | Performed by: NURSE PRACTITIONER

## 2020-03-11 PROCEDURE — 80053 COMPREHEN METABOLIC PANEL: CPT

## 2020-03-11 PROCEDURE — 99214 OFFICE O/P EST MOD 30 MIN: CPT | Mod: S$GLB,,, | Performed by: NURSE PRACTITIONER

## 2020-03-11 PROCEDURE — 99999 PR PBB SHADOW E&M-EST. PATIENT-LVL IV: ICD-10-PCS | Mod: PBBFAC,,, | Performed by: NURSE PRACTITIONER

## 2020-03-11 RX ORDER — ZONISAMIDE 50 MG/1
50 CAPSULE ORAL NIGHTLY
Qty: 30 CAPSULE | Refills: 2 | Status: SHIPPED | OUTPATIENT
Start: 2020-03-11 | End: 2020-06-10 | Stop reason: SDUPTHER

## 2020-03-11 NOTE — PROGRESS NOTES
HPI: Lacey Benz is a 39 y.o. female with 2 years of headaches, sometimes severe now with worse headaches and visual changes with pulsatile tinnitus suggesting increase intracranial pressure. 3/2019 MRI brain shows high fluid signal around the optic nerve which can be seen in Pseudotumor cerebri. Her exam is consistent with papilledema as well. LP shows findings consistent with Pseudotumor cerebri.     She presents today for a follow up visit. Repeat MRI Brain to include orbits done at her last visit, which was unrevealing for change for for orbital abnormalities. She is scheduled to follow up with Dr. Wei. Her Ophthalmologist requested that her MRI Brain be repeated to include Orbits with contrast, as there is some concern of posterior scleritis vs posterior compression. This was completed and was unrevealing for stephy-orbital pathology.     She continues with intermittent blurred vision. Her Lasix was increased to 40 mg at her last visit for her pseudotumor.     She has increased occipital headaches lately. This was helped with Zonegran initiation prior.     She plans to start working with a nutritionist. She is unable to lose weight per patient and does not know why she is gaining weight.     She complains of fatigue, and admits to a great deal of stress.     She saw Cardiology for her BLE swelling prior. Workup was overall unremarkable per Cardiology. BLE swelling is improved. She was placed on a statin for HLD.     Review of Systems   Constitutional: Negative for fever.   HENT: Negative for nosebleeds.    Eyes: Positive for blurred vision.   Respiratory: Negative for hemoptysis.    Cardiovascular: Positive for leg swelling.   Gastrointestinal: Negative for blood in stool.   Genitourinary: Negative for hematuria.   Musculoskeletal: Positive for neck pain. Negative for falls.   Skin: Negative for rash.   Neurological: Positive for headaches. Negative for dizziness.   Psychiatric/Behavioral: The  patient does not have insomnia.        I have reviewed all of this patient's past medical and surgical histories as well as family and social histories and active allergies and medications as documented in the electronic medical record.    Exam:  Gen Appearance, well developed/nourished in no apparent distress  CV: 2+ distal pulses with no edema or swelling  Neuro:  MS: Awake, alert, oriented to place, person, time, situation. Sustains attention. Recent/remote memory intact, Language is full to spontaneous speech/repetition/naming/comprehension. Fund of Knowledge is full  CN: Optic discs show no venous pulsations and papilledema, PERRL, Extraoccular movements and visual fields are full. Normal facial sensation and strength, Hearing symmetric, Tongue and Palate are midline and strong. Shoulder Shrug symmetric and strong.  Motor: Normal bulk, tone, no abnormal movements. 5/5 strength bilateral upper/lower extremities with 2+ reflexes and no clonus  Sensory: symmetric to light touch, pain, temp, and vibration, Romberg negative  Cerebellar: Finger-nose,Heal-shin, Rapid alternating movements intact  Gait: Normal stance, no ataxia  MSK Survey: bilateral occipital tenderness, R>L.     Imagin/2019 MRI Brain to include Orbits:  FINDINGS:  Intracranial compartment:    Ventricles and sulci are normal in size for age without evidence of hydrocephalus. No extra-axial blood or fluid collections.    The brain parenchyma appears normal. No mass lesion, acute hemorrhage, edema or acute infarct. No abnormal enhancement.    Normal vascular flow voids are preserved.    Skull/extracranial contents (limited evaluation): Bone marrow signal intensity is normal.    There is evidence of CSF signal protruding through region of the diaphragmatic sella causing concave deformity along the apex of the pituitary gland compatible with a benign partially empty sella.  Residual pituitary tissue along the sellar floor is noted.    Focused  attention of the bilateral orbits shows no evidence of abnormality within the intraorbital or retro-orbital spaces.  The extraocular muscles maintain equal symmetry.  No evidence of abnormal enhancement pattern.  Retinal attachment appears normal.      Impression       1. No significant finding within the brain pre and post-contrast injection.  2. Anatomic variant of benign partially empty sella formation.     MRI Brain 3/20/19:   No mass, but empty sella and high signal fluid around the optic nerve.     4/2019 MRV: Normal MRV.    Labs:  3/2018 CMP,CBC reviewed  5/2019 CMP reviewed  7/2019 CMP showed mild hypokalemia  10/2019 CMP normal    LP 2019:   The opening pressure was 44 cm of water. CSF labs and culture all normal.     Assessment/Plan: Lacey Benz is a 39 y.o. female with 2 years of headaches, sometimes severe now with 3 weeks of worse headaches and visual changes with pulsatile tinnitus suggesting increase intracranial pressure. 3/2019 MRI brain shows high fluid signal around the optic nerve which can be seen in Pseudotumor cerebri. Her exam is consistent with papilledema as well. LP shows findings consistent with Pseudotumor cerebri.     I recommend:   1. Continue with consult with Neuro-Ophthalmology for an opinion on her pseudotumor/papilledema.   2. She will follow up with Dr. Wei, retinal specialist, later this month.   -Repeat MRI Brain to include Orbits, given differential diagnosis if posterior compression vs. Posterior scleritis, was unrevealing for stephy-orbital pathology.   She did have significantly elevated opening pressure on LP, which argues for pseudotumor.   3. Continue Lasix 40 mg bid, and check CMP today, as well as Vitamin D and B12, given fatigue complaint, though this could be anxiety related.   -Notify clinic with any muscle cramping, which is a side effect of low potassium, which can occur on Lasix.  -Diamox was poorly tolerated prior, and she has had worsening of  papilledema off of this.  4. Dicussed stress reduction techniques, which are needed, as her schedule will not allow for counseling, and we are attempting to avoid antidepressant therapy, which could result in weight gain. No overt depression-she has more situational stress, related to her pseudotumor diagnosis.   5. She has more cervicogenic headaches lately, likely from increased C-spine tension, possibly from stress.   Increase Zonegran to 150 mg qhs for headache prevention.   -Short term relief with ONB's prior. She had occipital neuralgia, independent from her pseudotumor prior-now resolved.   6. She has a history of proteinuria evaluated by nephrology prior-thought to be related to diet at that time.    -Saw Cardiology for evaluation of BLE edema prior.   8. Weight loss is needed for resolution of pseudotumor.   -she will consult with a nutritionist soon.   -she does have hirsutism and acanthosis nigricans-consult with Ob/Gyn to evaluate for PCOS, which can contribute to insulin resistance, weight gain, and hormonal abnormalities.     FU 4 weeks    To ER with any visual loss/acute visual changes; verbalized understanding  Call with worsening headache/visual complaints

## 2020-03-11 NOTE — PATIENT INSTRUCTIONS
Please focus on ways to reduce stress, which is likely contributing to your fatigue and cold sores.     You may try:  Yoga  Meditation  Deep breathing  Massage  Counseling

## 2020-04-06 ENCOUNTER — TELEPHONE (OUTPATIENT)
Dept: NEUROLOGY | Facility: CLINIC | Age: 40
End: 2020-04-06

## 2020-05-20 DIAGNOSIS — G93.2 PSEUDOTUMOR CEREBRI: ICD-10-CM

## 2020-05-20 RX ORDER — FUROSEMIDE 40 MG/1
40 TABLET ORAL DAILY
Qty: 30 TABLET | Refills: 2 | Status: SHIPPED | OUTPATIENT
Start: 2020-05-20 | End: 2020-06-10 | Stop reason: SDUPTHER

## 2020-06-10 ENCOUNTER — OFFICE VISIT (OUTPATIENT)
Dept: NEUROLOGY | Facility: CLINIC | Age: 40
End: 2020-06-10
Payer: COMMERCIAL

## 2020-06-10 VITALS
TEMPERATURE: 98 F | SYSTOLIC BLOOD PRESSURE: 124 MMHG | BODY MASS INDEX: 38.72 KG/M2 | DIASTOLIC BLOOD PRESSURE: 78 MMHG | WEIGHT: 232.38 LBS | RESPIRATION RATE: 16 BRPM | HEIGHT: 65 IN | HEART RATE: 76 BPM

## 2020-06-10 DIAGNOSIS — E55.9 VITAMIN D DEFICIENCY: ICD-10-CM

## 2020-06-10 DIAGNOSIS — E66.01 CLASS 2 SEVERE OBESITY WITH SERIOUS COMORBIDITY AND BODY MASS INDEX (BMI) OF 39.0 TO 39.9 IN ADULT, UNSPECIFIED OBESITY TYPE: ICD-10-CM

## 2020-06-10 DIAGNOSIS — H47.10 PAPILLEDEMA: ICD-10-CM

## 2020-06-10 DIAGNOSIS — G44.86 CERVICOGENIC HEADACHE: Primary | ICD-10-CM

## 2020-06-10 DIAGNOSIS — M79.89 LEG SWELLING: ICD-10-CM

## 2020-06-10 DIAGNOSIS — M54.81 OCCIPITAL NEURALGIA, UNSPECIFIED LATERALITY: ICD-10-CM

## 2020-06-10 DIAGNOSIS — E53.8 B12 DEFICIENCY: ICD-10-CM

## 2020-06-10 DIAGNOSIS — G93.2 PSEUDOTUMOR CEREBRI: ICD-10-CM

## 2020-06-10 DIAGNOSIS — F43.9 STRESS: ICD-10-CM

## 2020-06-10 PROCEDURE — 99214 OFFICE O/P EST MOD 30 MIN: CPT | Mod: S$GLB,,, | Performed by: NURSE PRACTITIONER

## 2020-06-10 PROCEDURE — 99999 PR PBB SHADOW E&M-EST. PATIENT-LVL III: CPT | Mod: PBBFAC,,, | Performed by: NURSE PRACTITIONER

## 2020-06-10 PROCEDURE — 99999 PR PBB SHADOW E&M-EST. PATIENT-LVL III: ICD-10-PCS | Mod: PBBFAC,,, | Performed by: NURSE PRACTITIONER

## 2020-06-10 PROCEDURE — 3008F PR BODY MASS INDEX (BMI) DOCUMENTED: ICD-10-PCS | Mod: CPTII,S$GLB,, | Performed by: NURSE PRACTITIONER

## 2020-06-10 PROCEDURE — 3008F BODY MASS INDEX DOCD: CPT | Mod: CPTII,S$GLB,, | Performed by: NURSE PRACTITIONER

## 2020-06-10 PROCEDURE — 99214 PR OFFICE/OUTPT VISIT, EST, LEVL IV, 30-39 MIN: ICD-10-PCS | Mod: S$GLB,,, | Performed by: NURSE PRACTITIONER

## 2020-06-10 RX ORDER — ZONISAMIDE 50 MG/1
50 CAPSULE ORAL NIGHTLY
Qty: 30 CAPSULE | Refills: 5 | Status: SHIPPED | OUTPATIENT
Start: 2020-06-10 | End: 2020-08-11 | Stop reason: SDUPTHER

## 2020-06-10 RX ORDER — ZONISAMIDE 100 MG/1
100 CAPSULE ORAL NIGHTLY
Qty: 30 CAPSULE | Refills: 5 | Status: SHIPPED | OUTPATIENT
Start: 2020-06-10 | End: 2020-08-11 | Stop reason: SDUPTHER

## 2020-06-10 RX ORDER — CYCLOBENZAPRINE HCL 10 MG
10 TABLET ORAL NIGHTLY PRN
Qty: 30 TABLET | Refills: 3 | Status: SHIPPED | OUTPATIENT
Start: 2020-06-10 | End: 2020-06-20

## 2020-06-10 RX ORDER — FUROSEMIDE 40 MG/1
40 TABLET ORAL DAILY
Qty: 30 TABLET | Refills: 5 | Status: SHIPPED | OUTPATIENT
Start: 2020-06-10 | End: 2020-08-11 | Stop reason: SDUPTHER

## 2020-06-10 NOTE — PROGRESS NOTES
HPI: Lacey Benz is a 39 y.o. female with 2 years of headaches, sometimes severe now with worse headaches and visual changes with pulsatile tinnitus suggesting increase intracranial pressure. 3/2019 MRI brain shows high fluid signal around the optic nerve which can be seen in Pseudotumor cerebri. Her exam is consistent with papilledema as well. LP shows findings consistent with Pseudotumor cerebri.     She presents today for a follow up visit. Labs done at last visit for evaluation of her fatigue showed Vitamin D and B12 deficiencies, and she was advised to start supplementation. Her Zonegran was increased to 150 mg for further prevention of her cervicogenic headache. This has been helpful. Her headaches are less frequent. They are not daily, but continue to occur a few times each week. + neck pain.     She was referred to Dr. Musa/Neuro-ophthalmologist at [facility], but canceled this, due to Covid oubreak/protests, etc. She is scheduled for an eye exam with Dr. Wei today. This will be her first eye exam since her Lasix was increased. Repeat MRI Brain to include orbits, which was unrevealing for change for for orbital abnormalities. Her Ophthalmologist requested that her MRI Brain be repeated to include Orbits with contrast, as there is some concern of posterior scleritis vs posterior compression.     She continues with intermittent blurred vision. She is precsribed eyeglasses and does not wear them.     She lost 6 pounds since her last visit.     She continues with stress, but did take a side job, which she enjoys.     She saw Cardiology for her BLE swelling prior. Workup was overall unremarkable per Cardiology. BLE swelling is improved. She was placed on a statin for HLD.     Review of Systems   Constitutional: Negative for fever.   HENT: Negative for nosebleeds.    Eyes: Positive for blurred vision.   Respiratory: Negative for hemoptysis.    Cardiovascular: Positive for leg swelling.   Gastrointestinal:  Negative for blood in stool.   Genitourinary: Negative for hematuria.   Musculoskeletal: Positive for neck pain. Negative for falls.   Skin: Negative for rash.   Neurological: Positive for headaches. Negative for dizziness.   Psychiatric/Behavioral: The patient does not have insomnia.        I have reviewed all of this patient's past medical and surgical histories as well as family and social histories and active allergies and medications as documented in the electronic medical record.    Exam:  Gen Appearance, well developed/nourished in no apparent distress  CV: 2+ distal pulses with no edema or swelling  Neuro:  MS: Awake, alert, oriented to place, person, time, situation. Sustains attention. Recent/remote memory intact, Language is full to spontaneous speech/repetition/naming/comprehension. Fund of Knowledge is full  CN: Optic discs not visualized today, PERRL, Extraoccular movements and visual fields are full. Normal facial sensation and strength, Hearing symmetric, Tongue and Palate are midline and strong. Shoulder Shrug symmetric and strong.  Motor: Normal bulk, tone, no abnormal movements. 5/5 strength bilateral upper/lower extremities with 2+ reflexes and no clonus  Sensory: symmetric to light touch, pain, temp, and vibration, Romberg negative  Cerebellar: Finger-nose,Heal-shin, Rapid alternating movements intact  Gait: Normal stance, no ataxia  MSK Survey: bilateral occipital tenderness, R>L.     Imagin/2019 MRI Brain to include Orbits:  FINDINGS:  Intracranial compartment:    Ventricles and sulci are normal in size for age without evidence of hydrocephalus. No extra-axial blood or fluid collections.    The brain parenchyma appears normal. No mass lesion, acute hemorrhage, edema or acute infarct. No abnormal enhancement.    Normal vascular flow voids are preserved.    Skull/extracranial contents (limited evaluation): Bone marrow signal intensity is normal.    There is evidence of CSF signal  protruding through region of the diaphragmatic sella causing concave deformity along the apex of the pituitary gland compatible with a benign partially empty sella.  Residual pituitary tissue along the sellar floor is noted.    Focused attention of the bilateral orbits shows no evidence of abnormality within the intraorbital or retro-orbital spaces.  The extraocular muscles maintain equal symmetry.  No evidence of abnormal enhancement pattern.  Retinal attachment appears normal.      Impression       1. No significant finding within the brain pre and post-contrast injection.  2. Anatomic variant of benign partially empty sella formation.     MRI Brain 3/20/19:   No mass, but empty sella and high signal fluid around the optic nerve.     4/2019 MRV: Normal MRV.    Labs:  3/2018 CMP,CBC reviewed  5/2019 CMP reviewed  7/2019 CMP showed mild hypokalemia  10/2019 CMP normal  3/2020 CMP normal; Vitamin D and B12 low    LP 2019:   The opening pressure was 44 cm of water. CSF labs and culture all normal.     Assessment/Plan: Lacey Benz is a 39 y.o. female with 2 years of headaches, sometimes severe now with 3 weeks of worse headaches and visual changes with pulsatile tinnitus suggesting increase intracranial pressure. 3/2019 MRI brain shows high fluid signal around the optic nerve which can be seen in Pseudotumor cerebri. Her exam is consistent with papilledema as well. LP shows findings consistent with Pseudotumor cerebri.     I recommend:   1. C-spine exercise handout given. She continues with some cervicogenic type headaches/occipital neuralgia. Zonegran increase has helped some.   -Avoid prolonged C-spine flexion.   2. Start Flexeril 10 mg qhs for neck tension, which is likely contributing to her headaches.   3. Continue Zonegran to 150 mg qhs for headache prevention.   -Short term relief with ONB's prior. She had occipital neuralgia, independent from her pseudotumor prior.  3. Continue with updated eye exam  with Dr. Wei today. NO need to see Neuro-ophthalmology at this time.   4. Continue Lasix 40 mg bid for pseudotumor. CMP has been normal.   -Notify clinic with any muscle cramping, which is a side effect of low potassium, which can occur on Lasix   -Diamox was poorly tolerated prior, and she has had worsening of papilledema off of this.  5. Repeat MRI Brain to include Orbits, given differential diagnosis if posterior compression vs. Posterior scleritis, was unrevealing for stephy-orbital pathology.   She did have significantly elevated opening pressure on LP, which argues for pseudotumor.   6. Dicussed stress reduction techniques, which are needed, as her schedule will not allow for counseling, and we are attempting to avoid antidepressant therapy, which could result in weight gain. No overt depression-she has more situational stress, related to her pseudotumor diagnosis.   7. She has a history of proteinuria evaluated by nephrology prior-thought to be related to diet at that time.    -Saw Cardiology for evaluation of BLE edema prior.   8. Weight loss is needed for resolution of pseudotumor.   -she will consult with a nutritionist soon.   -she does have hirsutism and acanthosis nigricans-consult with Ob/Gyn to evaluate for PCOS, which can contribute to insulin resistance, weight gain, and hormonal abnormalities.   -6 pound weight loss noted since last visit.   9. Continue Vitamin B12 and Vitamin D supplements for deficiencies, which may be contributing to her fatigue. Check levels again at next visit.     FU 8 weeks    To ER with any visual loss/acute visual changes; verbalized understanding  Call with worsening headache/visual complaints

## 2020-08-11 ENCOUNTER — OFFICE VISIT (OUTPATIENT)
Dept: NEUROLOGY | Facility: CLINIC | Age: 40
End: 2020-08-11
Payer: COMMERCIAL

## 2020-08-11 VITALS
TEMPERATURE: 98 F | SYSTOLIC BLOOD PRESSURE: 132 MMHG | DIASTOLIC BLOOD PRESSURE: 82 MMHG | WEIGHT: 235.44 LBS | HEIGHT: 65 IN | RESPIRATION RATE: 16 BRPM | BODY MASS INDEX: 39.22 KG/M2 | HEART RATE: 86 BPM

## 2020-08-11 DIAGNOSIS — G44.86 CERVICOGENIC HEADACHE: ICD-10-CM

## 2020-08-11 DIAGNOSIS — E66.01 CLASS 2 SEVERE OBESITY WITH SERIOUS COMORBIDITY AND BODY MASS INDEX (BMI) OF 39.0 TO 39.9 IN ADULT, UNSPECIFIED OBESITY TYPE: ICD-10-CM

## 2020-08-11 DIAGNOSIS — G93.2 PSEUDOTUMOR CEREBRI: Primary | ICD-10-CM

## 2020-08-11 DIAGNOSIS — H47.10 PAPILLEDEMA: ICD-10-CM

## 2020-08-11 PROCEDURE — 99214 PR OFFICE/OUTPT VISIT, EST, LEVL IV, 30-39 MIN: ICD-10-PCS | Mod: S$GLB,,, | Performed by: NURSE PRACTITIONER

## 2020-08-11 PROCEDURE — 3008F BODY MASS INDEX DOCD: CPT | Mod: CPTII,S$GLB,, | Performed by: NURSE PRACTITIONER

## 2020-08-11 PROCEDURE — 99999 PR PBB SHADOW E&M-EST. PATIENT-LVL III: CPT | Mod: PBBFAC,,, | Performed by: NURSE PRACTITIONER

## 2020-08-11 PROCEDURE — 3008F PR BODY MASS INDEX (BMI) DOCUMENTED: ICD-10-PCS | Mod: CPTII,S$GLB,, | Performed by: NURSE PRACTITIONER

## 2020-08-11 PROCEDURE — 99999 PR PBB SHADOW E&M-EST. PATIENT-LVL III: ICD-10-PCS | Mod: PBBFAC,,, | Performed by: NURSE PRACTITIONER

## 2020-08-11 PROCEDURE — 99214 OFFICE O/P EST MOD 30 MIN: CPT | Mod: S$GLB,,, | Performed by: NURSE PRACTITIONER

## 2020-08-11 RX ORDER — ZONISAMIDE 50 MG/1
50 CAPSULE ORAL NIGHTLY
Qty: 30 CAPSULE | Refills: 4 | Status: SHIPPED | OUTPATIENT
Start: 2020-08-11 | End: 2020-10-21 | Stop reason: ALTCHOICE

## 2020-08-11 RX ORDER — FUROSEMIDE 40 MG/1
40 TABLET ORAL DAILY
Qty: 30 TABLET | Refills: 4 | Status: SHIPPED | OUTPATIENT
Start: 2020-08-11 | End: 2021-03-18 | Stop reason: SDUPTHER

## 2020-08-11 RX ORDER — ZONISAMIDE 100 MG/1
100 CAPSULE ORAL NIGHTLY
Qty: 30 CAPSULE | Refills: 4 | Status: SHIPPED | OUTPATIENT
Start: 2020-08-11 | End: 2020-10-21 | Stop reason: SDUPTHER

## 2020-08-11 NOTE — PROGRESS NOTES
HPI: Lacey Benz is a 40 y.o. female with 2 years of headaches, sometimes severe now with worse headaches and visual changes with pulsatile tinnitus suggesting increase intracranial pressure. 3/2019 MRI brain shows high fluid signal around the optic nerve which can be seen in Pseudotumor cerebri. Her exam is consistent with papilledema as well. LP shows findings consistent with Pseudotumor cerebri.     She presents today for a follow up visit. She had an eye exam since her last visit with me. I don't have her updated eye exam for review, but this was done in 6/2020. She was told that the swelling on one side was resolved, and was improved on the other side. No visual complaints at this time.     She had more cervicogenic headaches at her last visit with me. She was given a handout on C-spine exercises, which she did, but this did not have significant effect on her. She is unable to attend outpatient PT. She was prescribed Flexeril, but this overall very sedating. Declines further management of her C-spine complaints currently.     She is very stressed at this time.     She took leave from work this past week. She is home with her children, while they are doing virtual school.     She gained 3 pounds since her last visit. She will see her Ob/Gyn tomorrow to discuss possibility of PCOS, as the cause of her weight gain.     Migraines controlled with Zonegran, but she continues with breakthrough headaches, which occur in the context of increased right C-spine complaints.     She saw Cardiology for her BLE swelling prior. Workup was overall unremarkable per Cardiology. BLE swelling is improved. She was placed on a statin for HLD.     Review of Systems   Constitutional: Negative for fever.   HENT: Negative for nosebleeds.    Eyes: Negative for blurred vision.   Respiratory: Negative for hemoptysis.    Cardiovascular: Positive for leg swelling.   Gastrointestinal: Negative for blood in stool.   Genitourinary:  Negative for hematuria.   Musculoskeletal: Positive for neck pain. Negative for falls.   Skin: Negative for rash.   Neurological: Positive for headaches. Negative for dizziness.   Psychiatric/Behavioral: The patient is nervous/anxious. The patient does not have insomnia.        I have reviewed all of this patient's past medical and surgical histories as well as family and social histories and active allergies and medications as documented in the electronic medical record.    Exam:  Gen Appearance, well developed/nourished in no apparent distress  CV: 2+ distal pulses with no edema or swelling  Neuro:  MS: Awake, alert, oriented to place, person, time, situation. Sustains attention. Recent/remote memory intact, Language is full to spontaneous speech/repetition/naming/comprehension. Fund of Knowledge is full  CN: Optic discs not visualized today, PERRL, Extraoccular movements and visual fields are full. Normal facial sensation and strength, Hearing symmetric, Tongue and Palate are midline and strong. Shoulder Shrug symmetric and strong.  Motor: Normal bulk, tone, no abnormal movements. 5/5 strength bilateral upper/lower extremities with 2+ reflexes and no clonus  Sensory: symmetric to light touch, pain, temp, and vibration, Romberg negative  Cerebellar: Finger-nose,Heal-shin, Rapid alternating movements intact  Gait: Normal stance, no ataxia  MSK Survey: occipital tenderness on the right, right C-spine spasm.    Imagin/2019 MRI Brain to include Orbits:  FINDINGS:  Intracranial compartment:    Ventricles and sulci are normal in size for age without evidence of hydrocephalus. No extra-axial blood or fluid collections.    The brain parenchyma appears normal. No mass lesion, acute hemorrhage, edema or acute infarct. No abnormal enhancement.    Normal vascular flow voids are preserved.    Skull/extracranial contents (limited evaluation): Bone marrow signal intensity is normal.    There is evidence of CSF signal  protruding through region of the diaphragmatic sella causing concave deformity along the apex of the pituitary gland compatible with a benign partially empty sella.  Residual pituitary tissue along the sellar floor is noted.    Focused attention of the bilateral orbits shows no evidence of abnormality within the intraorbital or retro-orbital spaces.  The extraocular muscles maintain equal symmetry.  No evidence of abnormal enhancement pattern.  Retinal attachment appears normal.      Impression       1. No significant finding within the brain pre and post-contrast injection.  2. Anatomic variant of benign partially empty sella formation.     MRI Brain 3/20/19:   No mass, but empty sella and high signal fluid around the optic nerve.     4/2019 MRV: Normal MRV.    Labs:  3/2018 CMP,CBC reviewed  5/2019 CMP reviewed  7/2019 CMP showed mild hypokalemia  10/2019 CMP normal  3/2020 CMP normal; Vitamin D and B12 low    LP 2019:   The opening pressure was 44 cm of water. CSF labs and culture all normal.     Assessment/Plan: Lacey Benz is a 40 y.o. female with 2 years of headaches, sometimes severe now with 3 weeks of worse headaches and visual changes with pulsatile tinnitus suggesting increase intracranial pressure. 3/2019 MRI brain shows high fluid signal around the optic nerve which can be seen in Pseudotumor cerebri. Her exam is consistent with papilledema as well. LP shows findings consistent with Pseudotumor cerebri.     I recommend:   1. C-spine tension/related headaches failed to respond to home C-spine exercises/handout. She is unable to attend outpatient PT currently, due to schedule restrictions.   -She will increase frequency of home exercise, but declines further intervention at this time.   -Avoid prolonged C-spine flexion.   -She was unable to tolerate Flexeril, due to excess sedation.   2. Continue Zonegran to 150 mg qhs for headache prevention.   -Short term relief with ONB's prior. She had  occipital neuralgia, independent from her pseudotumor prior.  3. Eye exam in 5/2020 improved per patient. Obtain visit notes from Dr. Wei.   4. Continue Lasix 40 mg bid for pseudotumor. CMP has been normal. Check BMP at next visit.   -Notify clinic with any muscle cramping, which is a side effect of low potassium, which can occur on Lasix   -Diamox was poorly tolerated prior, and she has had worsening of papilledema off of this.  5. Repeat MRI Brain to include Orbits, given differential diagnosis if posterior compression vs. Posterior scleritis, was unrevealing for stephy-orbital pathology.   She did have significantly elevated opening pressure on LP, which argues for pseudotumor.   6. Dicussed stress reduction techniques, which are needed, as her schedule will not allow for counseling, and we are attempting to avoid antidepressant therapy, which could result in weight gain.   -No overt depression-she has more situational stress.   7. She has a history of proteinuria evaluated by nephrology prior-thought to be related to diet at that time.    -Saw Cardiology for evaluation of BLE edema prior.   8. Weight loss is needed for resolution of pseudotumor.   -she does have hirsutism and acanthosis nigricans-consult with Ob/Gyn to evaluate for PCOS, which can contribute to insulin resistance, weight gain, and hormonal abnormalities.   -she has gained 3 pounds since her last visit.   9. Continue Vitamin B12 and Vitamin D supplements for deficiencies, which may be contributing to her fatigue. Check levels again at next visit.     FU 4 months    To ER with any visual loss/acute visual changes; verbalized understanding  Call with worsening headache/visual complaints

## 2020-09-18 ENCOUNTER — PATIENT MESSAGE (OUTPATIENT)
Dept: OBSTETRICS AND GYNECOLOGY | Facility: CLINIC | Age: 40
End: 2020-09-18

## 2020-09-18 ENCOUNTER — TELEPHONE (OUTPATIENT)
Dept: OBSTETRICS AND GYNECOLOGY | Facility: CLINIC | Age: 40
End: 2020-09-18

## 2020-09-18 ENCOUNTER — CLINICAL SUPPORT (OUTPATIENT)
Dept: OBSTETRICS AND GYNECOLOGY | Facility: CLINIC | Age: 40
End: 2020-09-18
Payer: COMMERCIAL

## 2020-09-18 DIAGNOSIS — R39.9 UTI SYMPTOMS: Primary | ICD-10-CM

## 2020-09-18 LAB
BILIRUB SERPL-MCNC: NEGATIVE MG/DL
BLOOD URINE, POC: 250
CLARITY, POC UA: ABNORMAL
COLOR, POC UA: ABNORMAL
GLUCOSE UR QL STRIP: NORMAL
KETONES UR QL STRIP: NEGATIVE
LEUKOCYTE ESTERASE URINE, POC: 1
NITRITE, POC UA: NEGATIVE
PH, POC UA: 5
PROTEIN, POC: 30
SPECIFIC GRAVITY, POC UA: 1.02
UROBILINOGEN, POC UA: NORMAL

## 2020-09-18 PROCEDURE — 81002 URINALYSIS NONAUTO W/O SCOPE: CPT | Mod: S$GLB,,, | Performed by: OBSTETRICS & GYNECOLOGY

## 2020-09-18 PROCEDURE — 87186 SC STD MICRODIL/AGAR DIL: CPT

## 2020-09-18 PROCEDURE — 87086 URINE CULTURE/COLONY COUNT: CPT

## 2020-09-18 PROCEDURE — 87088 URINE BACTERIA CULTURE: CPT

## 2020-09-18 PROCEDURE — 81002 POCT URINE DIPSTICK WITHOUT MICROSCOPE: ICD-10-PCS | Mod: S$GLB,,, | Performed by: OBSTETRICS & GYNECOLOGY

## 2020-09-18 PROCEDURE — 87077 CULTURE AEROBIC IDENTIFY: CPT

## 2020-09-18 RX ORDER — NITROFURANTOIN 25; 75 MG/1; MG/1
100 CAPSULE ORAL 2 TIMES DAILY
Qty: 14 CAPSULE | Refills: 0 | Status: SHIPPED | OUTPATIENT
Start: 2020-09-18 | End: 2020-09-29

## 2020-09-18 NOTE — TELEPHONE ENCOUNTER
Patient seen for nurse visit for POCT urinalysis. Clean catch urine specimen obtained. Results:  Color, UA Judy    Spec Grav UA 1.025    pH, UA 5    WBC, UA 1    Nitrite, UA negative    Protein 30    Glucose, UA normal    Ketones, UA negative    Urobilinogen, UA normal    Bilirubin negative    Blood,     Clarity, UA Slightly Cloudy      Pt with PCN allergy noted. Dr. James is out of clinic today, message sent to physician on call.

## 2020-09-18 NOTE — TELEPHONE ENCOUNTER
Pt reports having episode with urinary urgency, frequency and pressure/pain after urinating. Patient states that she also noticed blood in her urine when wiping. Appt scheduled for nurse visit for patient to collect urine sample.

## 2020-09-18 NOTE — TELEPHONE ENCOUNTER
----- Message from Justina Benz MA sent at 9/18/2020  8:02 AM CDT -----  Contact: self  Lacey Benz  MRN: [id number]  Home Phone      [phone]  Work Phone      [phone]  Mobile          [phone]    Patient Care Team:  Princess Ojeda NP as PCP - General (Family Medicine)  Toan Gaona MD (Inactive) as Obstetrician (Obstetrics)  OB? No  What phone number can you be reached at?  [phone]  Message:   Needs to speak to nurse regarding blood in urine.

## 2020-09-21 LAB — BACTERIA UR CULT: ABNORMAL

## 2020-09-29 ENCOUNTER — OFFICE VISIT (OUTPATIENT)
Dept: OBSTETRICS AND GYNECOLOGY | Facility: CLINIC | Age: 40
End: 2020-09-29
Payer: COMMERCIAL

## 2020-09-29 ENCOUNTER — CLINICAL SUPPORT (OUTPATIENT)
Dept: OBSTETRICS AND GYNECOLOGY | Facility: CLINIC | Age: 40
End: 2020-09-29
Payer: COMMERCIAL

## 2020-09-29 VITALS
RESPIRATION RATE: 16 BRPM | DIASTOLIC BLOOD PRESSURE: 74 MMHG | BODY MASS INDEX: 38.99 KG/M2 | SYSTOLIC BLOOD PRESSURE: 110 MMHG | HEIGHT: 65 IN | WEIGHT: 234 LBS | HEART RATE: 92 BPM

## 2020-09-29 DIAGNOSIS — L68.0 HIRSUTISM: ICD-10-CM

## 2020-09-29 DIAGNOSIS — N30.01 ACUTE CYSTITIS WITH HEMATURIA: ICD-10-CM

## 2020-09-29 DIAGNOSIS — L65.9 HAIR LOSS: ICD-10-CM

## 2020-09-29 DIAGNOSIS — Z90.710 HISTORY OF HYSTERECTOMY FOR BENIGN DISEASE: ICD-10-CM

## 2020-09-29 DIAGNOSIS — R63.5 WEIGHT GAIN: ICD-10-CM

## 2020-09-29 DIAGNOSIS — Z12.39 SCREENING FOR BREAST CANCER: ICD-10-CM

## 2020-09-29 DIAGNOSIS — Z01.419 WELL WOMAN EXAM WITH ROUTINE GYNECOLOGICAL EXAM: Primary | ICD-10-CM

## 2020-09-29 PROCEDURE — 99396 PR PREVENTIVE VISIT,EST,40-64: ICD-10-PCS | Mod: S$GLB,,, | Performed by: OBSTETRICS & GYNECOLOGY

## 2020-09-29 PROCEDURE — 82627 DEHYDROEPIANDROSTERONE: CPT

## 2020-09-29 PROCEDURE — 84402 ASSAY OF FREE TESTOSTERONE: CPT

## 2020-09-29 PROCEDURE — 99999 PR PBB SHADOW E&M-EST. PATIENT-LVL III: ICD-10-PCS | Mod: PBBFAC,,, | Performed by: OBSTETRICS & GYNECOLOGY

## 2020-09-29 PROCEDURE — 36415 COLL VENOUS BLD VENIPUNCTURE: CPT

## 2020-09-29 PROCEDURE — 84443 ASSAY THYROID STIM HORMONE: CPT

## 2020-09-29 PROCEDURE — 83498 ASY HYDROXYPROGESTERONE 17-D: CPT

## 2020-09-29 PROCEDURE — 3008F BODY MASS INDEX DOCD: CPT | Mod: CPTII,S$GLB,, | Performed by: OBSTETRICS & GYNECOLOGY

## 2020-09-29 PROCEDURE — 99999 PR PBB SHADOW E&M-EST. PATIENT-LVL III: CPT | Mod: PBBFAC,,, | Performed by: OBSTETRICS & GYNECOLOGY

## 2020-09-29 PROCEDURE — 84146 ASSAY OF PROLACTIN: CPT

## 2020-09-29 PROCEDURE — 36415 PR COLLECTION VENOUS BLOOD,VENIPUNCTURE: ICD-10-PCS | Mod: S$GLB,,, | Performed by: OBSTETRICS & GYNECOLOGY

## 2020-09-29 PROCEDURE — 99396 PREV VISIT EST AGE 40-64: CPT | Mod: S$GLB,,, | Performed by: OBSTETRICS & GYNECOLOGY

## 2020-09-29 PROCEDURE — 3008F PR BODY MASS INDEX (BMI) DOCUMENTED: ICD-10-PCS | Mod: CPTII,S$GLB,, | Performed by: OBSTETRICS & GYNECOLOGY

## 2020-09-29 PROCEDURE — 36415 COLL VENOUS BLD VENIPUNCTURE: CPT | Mod: S$GLB,,, | Performed by: OBSTETRICS & GYNECOLOGY

## 2020-09-29 RX ORDER — CYCLOBENZAPRINE HCL 10 MG
10 TABLET ORAL 3 TIMES DAILY PRN
COMMUNITY

## 2020-09-29 RX ORDER — NYSTATIN AND TRIAMCINOLONE ACETONIDE 100000; 1 [USP'U]/G; MG/G
CREAM TOPICAL
Qty: 30 G | Refills: 1 | Status: SHIPPED | OUTPATIENT
Start: 2020-09-29 | End: 2021-03-18

## 2020-09-29 NOTE — PROGRESS NOTES
Subjective:    Patient ID: Lacey Benz is a 40 y.o. y.o. female.     Chief Complaint: Annual Well Woman Exam     History of Present Illness:  Lacey presents today for Annual Well Woman exam. She describes her menses as absent with hysterectomy.She denies pelvic pain.  She denies breast tenderness, masses, nipple discharge. She denies difficulty with urination or bowel movements. She denies menopausal symptoms such as hotflashes, vaginal dryness, and night sweats. She denies bloating, early satiety, or weight changes. She is sexually active. Contraception is by no method.    Patient submitted a urine last week when she had experienced gross hematuria and difficulty emptying bladder. She dropped off a specimen sample and culture results + E. Coli. She has not yet started her Macrobid.     She is currently being followed by neurology and diagnosed with pseudotumor cerebri. She is concerned because of weight gain despite exercise, hair loss, and facial hair growth. She brought this up to her neurologist and she suggested she get worked up for PCOS.     The following portions of the patient's history were reviewed and updated as appropriate: allergies, current medications, past family history, past medical history, past social history, past surgical history and problem list.      Menstrual History:   Patient's last menstrual period was 2015..     OB History        3    Para   3    Term   2       1    AB   0    Living   2       SAB   0    TAB        Ectopic        Multiple        Live Births   3                 The following portions of the patient's history were reviewed and updated as appropriate: allergies, current medications, past family history, past medical history, past social history, past surgical history and problem list.        ROS:     Review of Systems   Constitutional: Positive for unexpected weight change. Negative for activity change, appetite change, chills,  "diaphoresis, fatigue and fever.   HENT: Negative for mouth sores and tinnitus.    Eyes: Negative for discharge and visual disturbance.   Respiratory: Negative for cough, shortness of breath and wheezing.    Cardiovascular: Negative for chest pain, palpitations and leg swelling.   Gastrointestinal: Negative for abdominal pain, bloating, blood in stool, constipation, diarrhea, nausea and vomiting.   Endocrine: Positive for hair loss. Negative for diabetes, hot flashes, hyperthyroidism and hypothyroidism.   Genitourinary: Negative for dysuria, flank pain, frequency, genital sores, hematuria, urgency, vaginal bleeding, vaginal discharge, vaginal pain, postcoital bleeding and vaginal odor.   Musculoskeletal: Negative for back pain, joint swelling and myalgias.   Integumentary:  Negative for rash, acne, breast mass, nipple discharge and breast skin changes.   Neurological: Positive for headaches. Negative for seizures, syncope and numbness.   Hematological: Negative for adenopathy. Does not bruise/bleed easily.   Psychiatric/Behavioral: Negative for depression and sleep disturbance. The patient is not nervous/anxious.    Breast: Negative for mass, mastodynia, nipple discharge and skin changes      Objective:    Vital Signs:  Vitals:    09/29/20 1006   BP: 110/74   Pulse: 92   Resp: 16   Weight: 106.1 kg (234 lb)   Height: 5' 5" (1.651 m)         Physical Exam:  General:  alert, cooperative, appears stated age   Skin:  Skin color, texture, turgor normal. No rashes or lesions   HEENT:  conjunctivae/corneas clear. PERRL.   Neck: supple, trachea midline, no adenopathy or thyromegally   Respiratory:  clear to auscultation bilaterally   Heart:  regular rate and rhythm, S1, S2 normal, no murmur, click, rub or gallop   Breasts:  no discharge, erythema, or tenderness   Abdomen:  normal findings: bowel sounds normal, no masses palpable, no organomegaly and soft, non-tender   Pelvis: External genitalia: normal general " appearance  Urinary system: urethral meatus normal, bladder nontender  Vaginal: normal mucosa without prolapse or lesions  Cervix: absent, removed surgically  Uterus: absent, removed surgically  Adnexa: non palpable   Extremities: Normal ROM; no edema, no cyanosis   Neurologial: Normal strength and tone. No focal numbness or weakness. Reflexes 2+ and equal.   Psychiatric: normal mood, speech, dress, and thought processes         Assessment:       Healthy female exam.     1. Well woman exam with routine gynecological exam    2. Screening for breast cancer    3. History of hysterectomy for benign disease    4. Acute cystitis with hematuria    5. Weight gain    6. Hair loss    7. Hirsutism          Plan:       pap smear deferred    MMG ordered  PCOS labs  Will discuss with neurologist if patient can have trial of OCPs to see if that helps with hair loss  Start Macrobid     COUNSELING:  Lacey was counseled on STD pevention, use and side-effects of various contraceptive measures, A.C.O.G. Pap guidelines and recommendations for yearly pelvic exams in addition to recommendations for monthly self breast exams; to see her PCP for other health maintenance.

## 2020-09-29 NOTE — PROGRESS NOTES
Venipuncture to right AC x 1 attempt using aseptic technique. Pressure held to site, no active bleeding noted. Pressure dressing applied. Labs drawn as ordered.

## 2020-09-30 LAB
DHEA-S SERPL-MCNC: 116.6 UG/DL (ref 74.8–410.2)
PROLACTIN SERPL IA-MCNC: 13.6 NG/ML (ref 5.2–26.5)
TSH SERPL DL<=0.005 MIU/L-ACNC: 1.45 UIU/ML (ref 0.4–4)

## 2020-10-05 LAB
17OHP SERPL-MCNC: 67 NG/DL (ref 35–413)
TESTOST FREE SERPL-MCNC: 0.4 PG/ML

## 2020-10-16 ENCOUNTER — PATIENT MESSAGE (OUTPATIENT)
Dept: NEUROLOGY | Facility: CLINIC | Age: 40
End: 2020-10-16

## 2020-10-21 ENCOUNTER — LAB VISIT (OUTPATIENT)
Dept: LAB | Facility: HOSPITAL | Age: 40
End: 2020-10-21
Attending: NURSE PRACTITIONER
Payer: COMMERCIAL

## 2020-10-21 ENCOUNTER — OFFICE VISIT (OUTPATIENT)
Dept: NEUROLOGY | Facility: CLINIC | Age: 40
End: 2020-10-21
Payer: COMMERCIAL

## 2020-10-21 VITALS
DIASTOLIC BLOOD PRESSURE: 88 MMHG | TEMPERATURE: 98 F | HEART RATE: 84 BPM | RESPIRATION RATE: 14 BRPM | HEIGHT: 65 IN | WEIGHT: 235.56 LBS | SYSTOLIC BLOOD PRESSURE: 124 MMHG | BODY MASS INDEX: 39.25 KG/M2

## 2020-10-21 DIAGNOSIS — R53.81 MALAISE: ICD-10-CM

## 2020-10-21 DIAGNOSIS — E66.01 CLASS 2 SEVERE OBESITY WITH SERIOUS COMORBIDITY AND BODY MASS INDEX (BMI) OF 39.0 TO 39.9 IN ADULT, UNSPECIFIED OBESITY TYPE: ICD-10-CM

## 2020-10-21 DIAGNOSIS — H92.09 OTALGIA, UNSPECIFIED LATERALITY: ICD-10-CM

## 2020-10-21 DIAGNOSIS — G93.2 PSEUDOTUMOR CEREBRI: ICD-10-CM

## 2020-10-21 DIAGNOSIS — H93.19 TINNITUS, UNSPECIFIED LATERALITY: ICD-10-CM

## 2020-10-21 DIAGNOSIS — H47.10 PAPILLEDEMA: ICD-10-CM

## 2020-10-21 DIAGNOSIS — R42 DIZZINESS: ICD-10-CM

## 2020-10-21 LAB
ALBUMIN SERPL BCP-MCNC: 3.8 G/DL (ref 3.5–5.2)
ALP SERPL-CCNC: 78 U/L (ref 55–135)
ALT SERPL W/O P-5'-P-CCNC: 22 U/L (ref 10–44)
ANION GAP SERPL CALC-SCNC: 9 MMOL/L (ref 8–16)
AST SERPL-CCNC: 13 U/L (ref 10–40)
BASOPHILS # BLD AUTO: 0.09 K/UL (ref 0–0.2)
BASOPHILS NFR BLD: 0.6 % (ref 0–1.9)
BILIRUB SERPL-MCNC: 0.3 MG/DL (ref 0.1–1)
BILIRUB UR QL STRIP: NEGATIVE
BUN SERPL-MCNC: 8 MG/DL (ref 6–20)
CALCIUM SERPL-MCNC: 9.3 MG/DL (ref 8.7–10.5)
CHLORIDE SERPL-SCNC: 105 MMOL/L (ref 95–110)
CLARITY UR: CLEAR
CO2 SERPL-SCNC: 28 MMOL/L (ref 23–29)
COLOR UR: YELLOW
CREAT SERPL-MCNC: 0.9 MG/DL (ref 0.5–1.4)
DIFFERENTIAL METHOD: ABNORMAL
EOSINOPHIL # BLD AUTO: 0.2 K/UL (ref 0–0.5)
EOSINOPHIL NFR BLD: 1.3 % (ref 0–8)
ERYTHROCYTE [DISTWIDTH] IN BLOOD BY AUTOMATED COUNT: 12.9 % (ref 11.5–14.5)
EST. GFR  (AFRICAN AMERICAN): >60 ML/MIN/1.73 M^2
EST. GFR  (NON AFRICAN AMERICAN): >60 ML/MIN/1.73 M^2
GLUCOSE SERPL-MCNC: 101 MG/DL (ref 70–110)
GLUCOSE UR QL STRIP: NEGATIVE
HCT VFR BLD AUTO: 45.3 % (ref 37–48.5)
HGB BLD-MCNC: 14.7 G/DL (ref 12–16)
HGB UR QL STRIP: NEGATIVE
IMM GRANULOCYTES # BLD AUTO: 0.1 K/UL (ref 0–0.04)
IMM GRANULOCYTES NFR BLD AUTO: 0.7 % (ref 0–0.5)
KETONES UR QL STRIP: NEGATIVE
LEUKOCYTE ESTERASE UR QL STRIP: NEGATIVE
LYMPHOCYTES # BLD AUTO: 2.8 K/UL (ref 1–4.8)
LYMPHOCYTES NFR BLD: 19.9 % (ref 18–48)
MCH RBC QN AUTO: 30.1 PG (ref 27–31)
MCHC RBC AUTO-ENTMCNC: 32.5 G/DL (ref 32–36)
MCV RBC AUTO: 93 FL (ref 82–98)
MONOCYTES # BLD AUTO: 0.7 K/UL (ref 0.3–1)
MONOCYTES NFR BLD: 5.3 % (ref 4–15)
NEUTROPHILS # BLD AUTO: 10.1 K/UL (ref 1.8–7.7)
NEUTROPHILS NFR BLD: 72.2 % (ref 38–73)
NITRITE UR QL STRIP: NEGATIVE
NRBC BLD-RTO: 0 /100 WBC
PH UR STRIP: 7 [PH] (ref 5–8)
PLATELET # BLD AUTO: 240 K/UL (ref 150–350)
PMV BLD AUTO: 9.7 FL (ref 9.2–12.9)
POTASSIUM SERPL-SCNC: 4 MMOL/L (ref 3.5–5.1)
PROT SERPL-MCNC: 7 G/DL (ref 6–8.4)
PROT UR QL STRIP: ABNORMAL
RBC # BLD AUTO: 4.88 M/UL (ref 4–5.4)
SODIUM SERPL-SCNC: 142 MMOL/L (ref 136–145)
SP GR UR STRIP: 1.02 (ref 1–1.03)
URN SPEC COLLECT METH UR: ABNORMAL
UROBILINOGEN UR STRIP-ACNC: NEGATIVE EU/DL
WBC # BLD AUTO: 14 K/UL (ref 3.9–12.7)

## 2020-10-21 PROCEDURE — 36415 COLL VENOUS BLD VENIPUNCTURE: CPT

## 2020-10-21 PROCEDURE — 99999 PR PBB SHADOW E&M-EST. PATIENT-LVL IV: CPT | Mod: PBBFAC,,, | Performed by: NURSE PRACTITIONER

## 2020-10-21 PROCEDURE — 82607 VITAMIN B-12: CPT

## 2020-10-21 PROCEDURE — 3008F PR BODY MASS INDEX (BMI) DOCUMENTED: ICD-10-PCS | Mod: CPTII,S$GLB,, | Performed by: NURSE PRACTITIONER

## 2020-10-21 PROCEDURE — 80053 COMPREHEN METABOLIC PANEL: CPT

## 2020-10-21 PROCEDURE — 3008F BODY MASS INDEX DOCD: CPT | Mod: CPTII,S$GLB,, | Performed by: NURSE PRACTITIONER

## 2020-10-21 PROCEDURE — 81003 URINALYSIS AUTO W/O SCOPE: CPT

## 2020-10-21 PROCEDURE — 99999 PR PBB SHADOW E&M-EST. PATIENT-LVL IV: ICD-10-PCS | Mod: PBBFAC,,, | Performed by: NURSE PRACTITIONER

## 2020-10-21 PROCEDURE — 99214 OFFICE O/P EST MOD 30 MIN: CPT | Mod: S$GLB,,, | Performed by: NURSE PRACTITIONER

## 2020-10-21 PROCEDURE — 87088 URINE BACTERIA CULTURE: CPT

## 2020-10-21 PROCEDURE — 87086 URINE CULTURE/COLONY COUNT: CPT

## 2020-10-21 PROCEDURE — 99214 PR OFFICE/OUTPT VISIT, EST, LEVL IV, 30-39 MIN: ICD-10-PCS | Mod: S$GLB,,, | Performed by: NURSE PRACTITIONER

## 2020-10-21 PROCEDURE — 85025 COMPLETE CBC W/AUTO DIFF WBC: CPT

## 2020-10-21 PROCEDURE — 82306 VITAMIN D 25 HYDROXY: CPT

## 2020-10-21 RX ORDER — ZONISAMIDE 100 MG/1
200 CAPSULE ORAL NIGHTLY
Qty: 30 CAPSULE | Refills: 4 | Status: SHIPPED | OUTPATIENT
Start: 2020-10-21 | End: 2020-10-21 | Stop reason: CLARIF

## 2020-10-21 RX ORDER — ZONISAMIDE 50 MG/1
50 CAPSULE ORAL NIGHTLY
Qty: 30 CAPSULE | Refills: 3 | Status: SHIPPED | OUTPATIENT
Start: 2020-10-21 | End: 2021-03-18 | Stop reason: SDUPTHER

## 2020-10-21 RX ORDER — ZONISAMIDE 100 MG/1
100 CAPSULE ORAL NIGHTLY
Qty: 30 CAPSULE | Refills: 3 | Status: SHIPPED | OUTPATIENT
Start: 2020-10-21 | End: 2021-03-18 | Stop reason: SDUPTHER

## 2020-10-21 NOTE — PROGRESS NOTES
HPI: Lacey Benz is a 40 y.o. female with 2 years of headaches, sometimes severe now with worse headaches and visual changes with pulsatile tinnitus suggesting increase intracranial pressure. 3/2019 MRI brain shows high fluid signal around the optic nerve which can be seen in Pseudotumor cerebri. Her exam is consistent with papilledema as well. LP shows findings consistent with Pseudotumor cerebri.     She presents today with report of worsening headaches over the past 2 weeks. If she turns her head quickly, she feels disoriented. Headaches are located to the right occipital area and bifrontally. She has right eye and ear pain with right facial pain at times. She has more blurred vision with worsening of her headaches.     She has lower back pain. She was diagnosed with a UTI two weeks ago. She was urinating blood, and was found to have e-coli on urine culture. She only took two doses of Macrobid, as she did not believe that she had a UTI.     She did see Dr. Wei since her last visit, who told her that her papilledema was . She will see him again in 3 weeks.     She saw her Ob/Gyn-labs were unrevealing for PCOS. She will have ongoing workup for this with Ob/Gyn. Ob/Gyn advised that she could start birth control pills, but she is unsure why this was offered to her if she didn't have PCOS.     Neck pain much improved since last visit with massage, and C-spine exercise handout. Massage helped with stress, which is resolved since last visit.     Weight is stable since last visit.     She saw Cardiology for her BLE swelling prior. Workup was overall unremarkable per Cardiology. BLE swelling is improved. She was placed on a statin for HLD.     Review of Systems   Constitutional: Positive for malaise/fatigue. Negative for fever.   HENT: Positive for tinnitus. Negative for nosebleeds.    Eyes: Negative for blurred vision.   Respiratory: Negative for hemoptysis.    Cardiovascular: Positive for leg swelling.    Gastrointestinal: Negative for blood in stool.   Genitourinary: Positive for flank pain and hematuria.   Musculoskeletal: Positive for neck pain. Negative for falls.   Skin: Negative for rash.   Neurological: Positive for dizziness and headaches.   Psychiatric/Behavioral: The patient is nervous/anxious. The patient does not have insomnia.        I have reviewed all of this patient's past medical and surgical histories as well as family and social histories and active allergies and medications as documented in the electronic medical record.    Exam:  Gen Appearance, well developed/nourished in no apparent distress  CV: 2+ distal pulses with no edema or swelling  Neuro:  MS: Awake, alert, oriented to place, person, time, situation. Sustains attention. Recent/remote memory intact, Language is full to spontaneous speech/repetition/naming/comprehension. Fund of Knowledge is full  CN: Optic discs not visualized today, PERRL, Extraoccular movements and visual fields are full. Normal facial sensation and strength; fast beat nystagmus with left gaze, Hearing symmetric, Tongue and Palate are midline and strong. Shoulder Shrug symmetric and strong.  Motor: Normal bulk, tone, no abnormal movements. 5/5 strength bilateral upper/lower extremities with 2+ reflexes and no clonus  Sensory: symmetric to light touch, pain, temp, and vibration, Romberg negative  Cerebellar: Finger-nose,Heal-shin, Rapid alternating movements intact  Gait: Normal stance, no ataxia  MSK Survey: occipital tenderness on the right, right C-spine spasm.  HEENT: TM clear bilaterally  Head thrust did produce dizziness today.     Imagin/2019 MRI Brain to include Orbits:  FINDINGS:  Intracranial compartment:    Ventricles and sulci are normal in size for age without evidence of hydrocephalus. No extra-axial blood or fluid collections.    The brain parenchyma appears normal. No mass lesion, acute hemorrhage, edema or acute infarct. No abnormal  enhancement.    Normal vascular flow voids are preserved.    Skull/extracranial contents (limited evaluation): Bone marrow signal intensity is normal.    There is evidence of CSF signal protruding through region of the diaphragmatic sella causing concave deformity along the apex of the pituitary gland compatible with a benign partially empty sella.  Residual pituitary tissue along the sellar floor is noted.    Focused attention of the bilateral orbits shows no evidence of abnormality within the intraorbital or retro-orbital spaces.  The extraocular muscles maintain equal symmetry.  No evidence of abnormal enhancement pattern.  Retinal attachment appears normal.      Impression       1. No significant finding within the brain pre and post-contrast injection.  2. Anatomic variant of benign partially empty sella formation.     MRI Brain 3/20/19:   No mass, but empty sella and high signal fluid around the optic nerve.     4/2019 MRV: Normal MRV.    Labs:  3/2018 CMP,CBC reviewed  5/2019 CMP reviewed  7/2019 CMP showed mild hypokalemia  10/2019 CMP normal  3/2020 CMP normal; Vitamin D and B12 low    LP 2019:   The opening pressure was 44 cm of water. CSF labs and culture all normal.     Assessment/Plan: Lacey Benz is a 40 y.o. female with 2 years of headaches, sometimes severe now with 3 weeks of worse headaches and visual changes with pulsatile tinnitus suggesting increase intracranial pressure. 3/2019 MRI brain shows high fluid signal around the optic nerve which can be seen in Pseudotumor cerebri. Her exam is consistent with papilledema as well. LP shows findings consistent with Pseudotumor cerebri.     I recommend:   1. Refer to ENT for new onset of right ear pain, tinnitus, and dizziness; however, this could be migraine/pseudotumor related.   2. Unclear if dizziness is new onset vertigo with ear complaints or from worsening headaches/pseudotumor.   -she has had a recent significant UTI, which she did  not treat, which could be contributing to her headaches.   -Check CBC, CMP, TSH, B12, Vitamin D, UA, and urinalysis, as worsening headaches malaise could be related to a systemic cause  3. She continues with papilledema, L>R, per eye exam on 9/30/2020 per Dr. Wei. This will be repeated in a few weeks from now.   -I advised her to see Dr. Wei sooner to determine if papilledema is worse, given her tinnitus, as this could be related to pseudotumor.   -weight loss is needed for reduction of papilledema. Weight has been stable/unchanged.   -Consider Zonegran increase at next visit.   4. Consider repeat MRI Brain and MRA, pending above workup.   5. Labs unrevealing for PCOS per Ob/Gyn.   6. Neck pain improved lately. She did home exercises per handout, and had massage, which also helped with stress.   -She was unable to tolerate Flexeril, due to excess sedation.   7. Continue Zonegran to 150 mg qhs for headache prevention.   -Short term relief with ONB's prior. She had occipital neuralgia, independent from her pseudotumor prior.  8. Continue Lasix 40 mg bid for pseudotumor. CMP has been normal. Check BMP at next visit.   -Notify clinic with any muscle cramping, which is a side effect of low potassium, which can occur on Lasix   -Diamox was poorly tolerated prior, and she has had worsening of papilledema off of this.  9. Repeat MRI Brain to include Orbits, given differential diagnosis if posterior compression vs. Posterior scleritis, was unrevealing for stephy-orbital pathology.   She did have significantly elevated opening pressure on LP, which argues for pseudotumor.   10. Dicussed stress reduction techniques, which are needed, as her schedule will not allow for counseling, and we are attempting to avoid antidepressant therapy, which could result in weight gain.   -No overt depression-she has more situational stress.   7. She has a history of proteinuria evaluated by nephrology prior-thought to be related to diet at  that time.    -Saw Cardiology for evaluation of BLE edema prior.   8. Continue Vitamin B12 and Vitamin D supplements for deficiencies, which may be contributing to her fatigue.     FU 4 months    To ER with any visual loss/acute visual changes; verbalized understanding  Call with worsening headache/visual complaints

## 2020-10-22 LAB
25(OH)D3+25(OH)D2 SERPL-MCNC: 39 NG/ML (ref 30–96)
VIT B12 SERPL-MCNC: 899 PG/ML (ref 210–950)

## 2020-10-23 LAB — BACTERIA UR CULT: ABNORMAL

## 2020-11-09 ENCOUNTER — HOSPITAL ENCOUNTER (OUTPATIENT)
Dept: RADIOLOGY | Facility: HOSPITAL | Age: 40
Discharge: HOME | End: 2020-11-09
Attending: OBSTETRICS & GYNECOLOGY
Payer: COMMERCIAL

## 2020-11-09 VITALS — BODY MASS INDEX: 39.15 KG/M2 | WEIGHT: 235 LBS | HEIGHT: 65 IN

## 2020-11-09 DIAGNOSIS — Z12.39 SCREENING FOR BREAST CANCER: ICD-10-CM

## 2020-11-09 PROCEDURE — 77067 SCR MAMMO BI INCL CAD: CPT | Mod: TC

## 2021-03-18 ENCOUNTER — OFFICE VISIT (OUTPATIENT)
Dept: NEUROLOGY | Facility: CLINIC | Age: 41
End: 2021-03-18
Payer: COMMERCIAL

## 2021-03-18 ENCOUNTER — LAB VISIT (OUTPATIENT)
Dept: LAB | Facility: HOSPITAL | Age: 41
End: 2021-03-18
Attending: NURSE PRACTITIONER
Payer: COMMERCIAL

## 2021-03-18 VITALS
WEIGHT: 237.19 LBS | HEIGHT: 65 IN | TEMPERATURE: 97 F | SYSTOLIC BLOOD PRESSURE: 122 MMHG | HEART RATE: 84 BPM | DIASTOLIC BLOOD PRESSURE: 70 MMHG | BODY MASS INDEX: 39.52 KG/M2 | OXYGEN SATURATION: 98 % | RESPIRATION RATE: 19 BRPM

## 2021-03-18 DIAGNOSIS — E53.8 B12 DEFICIENCY: ICD-10-CM

## 2021-03-18 DIAGNOSIS — G93.2 PSEUDOTUMOR CEREBRI: Primary | ICD-10-CM

## 2021-03-18 DIAGNOSIS — M79.89 LEG SWELLING: ICD-10-CM

## 2021-03-18 DIAGNOSIS — H47.10 PAPILLEDEMA: ICD-10-CM

## 2021-03-18 DIAGNOSIS — F43.9 STRESS: ICD-10-CM

## 2021-03-18 DIAGNOSIS — G93.2 PSEUDOTUMOR CEREBRI: ICD-10-CM

## 2021-03-18 LAB
ALBUMIN SERPL BCP-MCNC: 4 G/DL (ref 3.5–5.2)
ALP SERPL-CCNC: 91 U/L (ref 55–135)
ALT SERPL W/O P-5'-P-CCNC: 27 U/L (ref 10–44)
ANION GAP SERPL CALC-SCNC: 6 MMOL/L (ref 8–16)
AST SERPL-CCNC: 16 U/L (ref 10–40)
BILIRUB SERPL-MCNC: 0.5 MG/DL (ref 0.1–1)
BUN SERPL-MCNC: 8 MG/DL (ref 6–20)
CALCIUM SERPL-MCNC: 8.7 MG/DL (ref 8.7–10.5)
CHLORIDE SERPL-SCNC: 109 MMOL/L (ref 95–110)
CO2 SERPL-SCNC: 25 MMOL/L (ref 23–29)
CREAT SERPL-MCNC: 0.9 MG/DL (ref 0.5–1.4)
EST. GFR  (AFRICAN AMERICAN): >60 ML/MIN/1.73 M^2
EST. GFR  (NON AFRICAN AMERICAN): >60 ML/MIN/1.73 M^2
GLUCOSE SERPL-MCNC: 97 MG/DL (ref 70–110)
POTASSIUM SERPL-SCNC: 3.9 MMOL/L (ref 3.5–5.1)
PROT SERPL-MCNC: 7.3 G/DL (ref 6–8.4)
SODIUM SERPL-SCNC: 140 MMOL/L (ref 136–145)
VIT B12 SERPL-MCNC: 558 PG/ML (ref 210–950)

## 2021-03-18 PROCEDURE — 99214 PR OFFICE/OUTPT VISIT, EST, LEVL IV, 30-39 MIN: ICD-10-PCS | Mod: S$GLB,,, | Performed by: NURSE PRACTITIONER

## 2021-03-18 PROCEDURE — 99214 OFFICE O/P EST MOD 30 MIN: CPT | Mod: S$GLB,,, | Performed by: NURSE PRACTITIONER

## 2021-03-18 PROCEDURE — 36415 COLL VENOUS BLD VENIPUNCTURE: CPT | Performed by: NURSE PRACTITIONER

## 2021-03-18 PROCEDURE — 80053 COMPREHEN METABOLIC PANEL: CPT | Performed by: NURSE PRACTITIONER

## 2021-03-18 PROCEDURE — 82607 VITAMIN B-12: CPT | Performed by: NURSE PRACTITIONER

## 2021-03-18 PROCEDURE — 1126F PR PAIN SEVERITY QUANTIFIED, NO PAIN PRESENT: ICD-10-PCS | Mod: S$GLB,,, | Performed by: NURSE PRACTITIONER

## 2021-03-18 PROCEDURE — 1126F AMNT PAIN NOTED NONE PRSNT: CPT | Mod: S$GLB,,, | Performed by: NURSE PRACTITIONER

## 2021-03-18 PROCEDURE — 99999 PR PBB SHADOW E&M-EST. PATIENT-LVL III: ICD-10-PCS | Mod: PBBFAC,,, | Performed by: NURSE PRACTITIONER

## 2021-03-18 PROCEDURE — 3008F PR BODY MASS INDEX (BMI) DOCUMENTED: ICD-10-PCS | Mod: CPTII,S$GLB,, | Performed by: NURSE PRACTITIONER

## 2021-03-18 PROCEDURE — 99999 PR PBB SHADOW E&M-EST. PATIENT-LVL III: CPT | Mod: PBBFAC,,, | Performed by: NURSE PRACTITIONER

## 2021-03-18 PROCEDURE — 3008F BODY MASS INDEX DOCD: CPT | Mod: CPTII,S$GLB,, | Performed by: NURSE PRACTITIONER

## 2021-03-18 RX ORDER — ZONISAMIDE 100 MG/1
100 CAPSULE ORAL NIGHTLY
Qty: 30 CAPSULE | Refills: 5 | Status: SHIPPED | OUTPATIENT
Start: 2021-03-18 | End: 2021-07-21 | Stop reason: SDUPTHER

## 2021-03-18 RX ORDER — FUROSEMIDE 40 MG/1
40 TABLET ORAL DAILY
Qty: 30 TABLET | Refills: 5 | Status: SHIPPED | OUTPATIENT
Start: 2021-03-18 | End: 2021-07-21 | Stop reason: SDUPTHER

## 2021-03-18 RX ORDER — CHOLESTYRAMINE LIGHT 4 G/5.7G
1 POWDER, FOR SUSPENSION ORAL
COMMUNITY
Start: 2021-03-05

## 2021-03-18 RX ORDER — ATORVASTATIN CALCIUM 40 MG/1
40 TABLET, FILM COATED ORAL DAILY
COMMUNITY
Start: 2021-03-08

## 2021-03-18 RX ORDER — ZONISAMIDE 50 MG/1
50 CAPSULE ORAL NIGHTLY
Qty: 30 CAPSULE | Refills: 5 | Status: SHIPPED | OUTPATIENT
Start: 2021-03-18 | End: 2021-07-21 | Stop reason: SDUPTHER

## 2021-05-04 ENCOUNTER — PATIENT MESSAGE (OUTPATIENT)
Dept: RESEARCH | Facility: HOSPITAL | Age: 41
End: 2021-05-04

## 2021-07-10 NOTE — TELEPHONE ENCOUNTER
----- Message from Ana Hart sent at 2020  2:11 PM CDT -----  Contact:  - HECTOR Benz  MRN: [id number]  : 1980  PCP: Princess Ojeda  Home Phone      [phone]  Work Phone      [phone]  Mobile          [phone]      MESSAGE:  states that the patient has been having trouble with weight gain and would like to know if she can do the new U-Keto diet with the medications that she takes.        Phone: [phone]    
Notify patient: I am not familiar with that supplement. Supplements are not regulated by the FDA. If she decides to take it, she should stop for any side effects  
Patient's  notified and understanding was voiced.   
U-Keto is a supplement that eats fat at a higher rate. 800MG tablet.     Please advise  
26-Jul-2021

## 2021-07-21 ENCOUNTER — OFFICE VISIT (OUTPATIENT)
Dept: NEUROLOGY | Facility: CLINIC | Age: 41
End: 2021-07-21
Payer: COMMERCIAL

## 2021-07-21 VITALS
HEART RATE: 109 BPM | BODY MASS INDEX: 39.67 KG/M2 | DIASTOLIC BLOOD PRESSURE: 82 MMHG | HEIGHT: 65 IN | SYSTOLIC BLOOD PRESSURE: 116 MMHG | OXYGEN SATURATION: 97 % | WEIGHT: 238.13 LBS

## 2021-07-21 DIAGNOSIS — G44.86 CERVICOGENIC HEADACHE: ICD-10-CM

## 2021-07-21 DIAGNOSIS — H47.10 PAPILLEDEMA: Primary | ICD-10-CM

## 2021-07-21 DIAGNOSIS — E55.9 VITAMIN D DEFICIENCY: ICD-10-CM

## 2021-07-21 DIAGNOSIS — E53.8 B12 DEFICIENCY: ICD-10-CM

## 2021-07-21 DIAGNOSIS — G93.2 PSEUDOTUMOR CEREBRI: ICD-10-CM

## 2021-07-21 PROCEDURE — 99999 PR PBB SHADOW E&M-EST. PATIENT-LVL III: ICD-10-PCS | Mod: PBBFAC,,, | Performed by: NURSE PRACTITIONER

## 2021-07-21 PROCEDURE — 3008F PR BODY MASS INDEX (BMI) DOCUMENTED: ICD-10-PCS | Mod: CPTII,S$GLB,, | Performed by: NURSE PRACTITIONER

## 2021-07-21 PROCEDURE — 3008F BODY MASS INDEX DOCD: CPT | Mod: CPTII,S$GLB,, | Performed by: NURSE PRACTITIONER

## 2021-07-21 PROCEDURE — 99999 PR PBB SHADOW E&M-EST. PATIENT-LVL III: CPT | Mod: PBBFAC,,, | Performed by: NURSE PRACTITIONER

## 2021-07-21 PROCEDURE — 99213 OFFICE O/P EST LOW 20 MIN: CPT | Mod: S$GLB,,, | Performed by: NURSE PRACTITIONER

## 2021-07-21 PROCEDURE — 99213 PR OFFICE/OUTPT VISIT, EST, LEVL III, 20-29 MIN: ICD-10-PCS | Mod: S$GLB,,, | Performed by: NURSE PRACTITIONER

## 2021-07-21 RX ORDER — ZONISAMIDE 50 MG/1
50 CAPSULE ORAL NIGHTLY
Qty: 90 CAPSULE | Refills: 1 | Status: SHIPPED | OUTPATIENT
Start: 2021-07-21 | End: 2022-01-25 | Stop reason: SDUPTHER

## 2021-07-21 RX ORDER — NYSTATIN AND TRIAMCINOLONE ACETONIDE 100000; 1 [USP'U]/G; MG/G
CREAM TOPICAL
COMMUNITY
End: 2021-07-21

## 2021-07-21 RX ORDER — FUROSEMIDE 40 MG/1
40 TABLET ORAL DAILY
Qty: 90 TABLET | Refills: 1 | Status: SHIPPED | OUTPATIENT
Start: 2021-07-21 | End: 2022-01-25 | Stop reason: SDUPTHER

## 2021-07-21 RX ORDER — ZONISAMIDE 100 MG/1
100 CAPSULE ORAL NIGHTLY
Qty: 90 CAPSULE | Refills: 1 | Status: SHIPPED | OUTPATIENT
Start: 2021-07-21 | End: 2022-01-25 | Stop reason: SDUPTHER

## 2021-08-24 ENCOUNTER — TELEPHONE (OUTPATIENT)
Dept: NEUROLOGY | Facility: CLINIC | Age: 41
End: 2021-08-24

## 2022-01-25 ENCOUNTER — TELEPHONE (OUTPATIENT)
Dept: OPHTHALMOLOGY | Facility: CLINIC | Age: 42
End: 2022-01-25
Payer: COMMERCIAL

## 2022-01-25 ENCOUNTER — OFFICE VISIT (OUTPATIENT)
Dept: NEUROLOGY | Facility: CLINIC | Age: 42
End: 2022-01-25
Payer: COMMERCIAL

## 2022-01-25 ENCOUNTER — LAB VISIT (OUTPATIENT)
Dept: LAB | Facility: HOSPITAL | Age: 42
End: 2022-01-25
Attending: NURSE PRACTITIONER
Payer: COMMERCIAL

## 2022-01-25 VITALS
DIASTOLIC BLOOD PRESSURE: 72 MMHG | RESPIRATION RATE: 18 BRPM | HEART RATE: 80 BPM | BODY MASS INDEX: 41.87 KG/M2 | OXYGEN SATURATION: 99 % | HEIGHT: 65 IN | WEIGHT: 251.31 LBS | SYSTOLIC BLOOD PRESSURE: 120 MMHG

## 2022-01-25 DIAGNOSIS — G93.2 PSEUDOTUMOR: Primary | ICD-10-CM

## 2022-01-25 DIAGNOSIS — G93.2 PSEUDOTUMOR: ICD-10-CM

## 2022-01-25 DIAGNOSIS — G93.2 PSEUDOTUMOR CEREBRI: ICD-10-CM

## 2022-01-25 DIAGNOSIS — G43.709 CHRONIC MIGRAINE WITHOUT AURA WITHOUT STATUS MIGRAINOSUS, NOT INTRACTABLE: ICD-10-CM

## 2022-01-25 LAB
ALBUMIN SERPL BCP-MCNC: 3.8 G/DL (ref 3.5–5.2)
ALP SERPL-CCNC: 73 U/L (ref 55–135)
ALT SERPL W/O P-5'-P-CCNC: 30 U/L (ref 10–44)
ANION GAP SERPL CALC-SCNC: 11 MMOL/L (ref 8–16)
AST SERPL-CCNC: 17 U/L (ref 10–40)
BILIRUB SERPL-MCNC: 0.4 MG/DL (ref 0.1–1)
BUN SERPL-MCNC: 9 MG/DL (ref 6–20)
CALCIUM SERPL-MCNC: 8.9 MG/DL (ref 8.7–10.5)
CHLORIDE SERPL-SCNC: 105 MMOL/L (ref 95–110)
CO2 SERPL-SCNC: 26 MMOL/L (ref 23–29)
CREAT SERPL-MCNC: 0.8 MG/DL (ref 0.5–1.4)
EST. GFR  (AFRICAN AMERICAN): >60 ML/MIN/1.73 M^2
EST. GFR  (NON AFRICAN AMERICAN): >60 ML/MIN/1.73 M^2
GLUCOSE SERPL-MCNC: 90 MG/DL (ref 70–110)
POTASSIUM SERPL-SCNC: 3.9 MMOL/L (ref 3.5–5.1)
PROT SERPL-MCNC: 7 G/DL (ref 6–8.4)
SODIUM SERPL-SCNC: 142 MMOL/L (ref 136–145)

## 2022-01-25 PROCEDURE — 36415 COLL VENOUS BLD VENIPUNCTURE: CPT | Performed by: NURSE PRACTITIONER

## 2022-01-25 PROCEDURE — 1159F PR MEDICATION LIST DOCUMENTED IN MEDICAL RECORD: ICD-10-PCS | Mod: CPTII,S$GLB,, | Performed by: NURSE PRACTITIONER

## 2022-01-25 PROCEDURE — 99214 OFFICE O/P EST MOD 30 MIN: CPT | Mod: S$GLB,,, | Performed by: NURSE PRACTITIONER

## 2022-01-25 PROCEDURE — 3078F PR MOST RECENT DIASTOLIC BLOOD PRESSURE < 80 MM HG: ICD-10-PCS | Mod: CPTII,S$GLB,, | Performed by: NURSE PRACTITIONER

## 2022-01-25 PROCEDURE — 99999 PR PBB SHADOW E&M-EST. PATIENT-LVL IV: CPT | Mod: PBBFAC,,, | Performed by: NURSE PRACTITIONER

## 2022-01-25 PROCEDURE — 3074F SYST BP LT 130 MM HG: CPT | Mod: CPTII,S$GLB,, | Performed by: NURSE PRACTITIONER

## 2022-01-25 PROCEDURE — 3074F PR MOST RECENT SYSTOLIC BLOOD PRESSURE < 130 MM HG: ICD-10-PCS | Mod: CPTII,S$GLB,, | Performed by: NURSE PRACTITIONER

## 2022-01-25 PROCEDURE — 80053 COMPREHEN METABOLIC PANEL: CPT | Performed by: NURSE PRACTITIONER

## 2022-01-25 PROCEDURE — 3078F DIAST BP <80 MM HG: CPT | Mod: CPTII,S$GLB,, | Performed by: NURSE PRACTITIONER

## 2022-01-25 PROCEDURE — 3008F PR BODY MASS INDEX (BMI) DOCUMENTED: ICD-10-PCS | Mod: CPTII,S$GLB,, | Performed by: NURSE PRACTITIONER

## 2022-01-25 PROCEDURE — 1160F PR REVIEW ALL MEDS BY PRESCRIBER/CLIN PHARMACIST DOCUMENTED: ICD-10-PCS | Mod: CPTII,S$GLB,, | Performed by: NURSE PRACTITIONER

## 2022-01-25 PROCEDURE — 99214 PR OFFICE/OUTPT VISIT, EST, LEVL IV, 30-39 MIN: ICD-10-PCS | Mod: S$GLB,,, | Performed by: NURSE PRACTITIONER

## 2022-01-25 PROCEDURE — 1160F RVW MEDS BY RX/DR IN RCRD: CPT | Mod: CPTII,S$GLB,, | Performed by: NURSE PRACTITIONER

## 2022-01-25 PROCEDURE — 3008F BODY MASS INDEX DOCD: CPT | Mod: CPTII,S$GLB,, | Performed by: NURSE PRACTITIONER

## 2022-01-25 PROCEDURE — 1159F MED LIST DOCD IN RCRD: CPT | Mod: CPTII,S$GLB,, | Performed by: NURSE PRACTITIONER

## 2022-01-25 PROCEDURE — 99999 PR PBB SHADOW E&M-EST. PATIENT-LVL IV: ICD-10-PCS | Mod: PBBFAC,,, | Performed by: NURSE PRACTITIONER

## 2022-01-25 RX ORDER — ZONISAMIDE 100 MG/1
100 CAPSULE ORAL NIGHTLY
Qty: 90 CAPSULE | Refills: 1 | Status: SHIPPED | OUTPATIENT
Start: 2022-01-25 | End: 2022-05-10 | Stop reason: SDUPTHER

## 2022-01-25 RX ORDER — ZONISAMIDE 50 MG/1
50 CAPSULE ORAL NIGHTLY
Qty: 90 CAPSULE | Refills: 1 | Status: SHIPPED | OUTPATIENT
Start: 2022-01-25 | End: 2022-05-10 | Stop reason: SDUPTHER

## 2022-01-25 RX ORDER — FUROSEMIDE 40 MG/1
40 TABLET ORAL DAILY
Qty: 90 TABLET | Refills: 1 | Status: SHIPPED | OUTPATIENT
Start: 2022-01-25 | End: 2023-01-25
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
## 2022-01-25 NOTE — TELEPHONE ENCOUNTER
----- Message from Anni Mayberry MA sent at 1/25/2022  2:11 PM CST -----    ----- Message -----  From: Ping Chapa LPN  Sent: 1/25/2022   1:59 PM CST  To: [facility] Staff    Please contact patient for appointment.

## 2022-05-10 ENCOUNTER — OFFICE VISIT (OUTPATIENT)
Dept: NEUROLOGY | Facility: CLINIC | Age: 42
End: 2022-05-10
Payer: COMMERCIAL

## 2022-05-10 VITALS
HEART RATE: 93 BPM | RESPIRATION RATE: 18 BRPM | BODY MASS INDEX: 42.01 KG/M2 | SYSTOLIC BLOOD PRESSURE: 130 MMHG | DIASTOLIC BLOOD PRESSURE: 84 MMHG | HEIGHT: 65 IN | WEIGHT: 252.13 LBS

## 2022-05-10 DIAGNOSIS — G44.86 CERVICOGENIC HEADACHE: ICD-10-CM

## 2022-05-10 DIAGNOSIS — G93.2 PSEUDOTUMOR CEREBRI: ICD-10-CM

## 2022-05-10 DIAGNOSIS — G43.709 CHRONIC MIGRAINE WITHOUT AURA WITHOUT STATUS MIGRAINOSUS, NOT INTRACTABLE: Primary | ICD-10-CM

## 2022-05-10 PROCEDURE — 3079F PR MOST RECENT DIASTOLIC BLOOD PRESSURE 80-89 MM HG: ICD-10-PCS | Mod: CPTII,S$GLB,, | Performed by: NURSE PRACTITIONER

## 2022-05-10 PROCEDURE — 1160F PR REVIEW ALL MEDS BY PRESCRIBER/CLIN PHARMACIST DOCUMENTED: ICD-10-PCS | Mod: CPTII,S$GLB,, | Performed by: NURSE PRACTITIONER

## 2022-05-10 PROCEDURE — 3075F PR MOST RECENT SYSTOLIC BLOOD PRESS GE 130-139MM HG: ICD-10-PCS | Mod: CPTII,S$GLB,, | Performed by: NURSE PRACTITIONER

## 2022-05-10 PROCEDURE — 3075F SYST BP GE 130 - 139MM HG: CPT | Mod: CPTII,S$GLB,, | Performed by: NURSE PRACTITIONER

## 2022-05-10 PROCEDURE — 1160F RVW MEDS BY RX/DR IN RCRD: CPT | Mod: CPTII,S$GLB,, | Performed by: NURSE PRACTITIONER

## 2022-05-10 PROCEDURE — 3008F BODY MASS INDEX DOCD: CPT | Mod: CPTII,S$GLB,, | Performed by: NURSE PRACTITIONER

## 2022-05-10 PROCEDURE — 99999 PR PBB SHADOW E&M-EST. PATIENT-LVL III: CPT | Mod: PBBFAC,,, | Performed by: NURSE PRACTITIONER

## 2022-05-10 PROCEDURE — 99214 OFFICE O/P EST MOD 30 MIN: CPT | Mod: S$GLB,,, | Performed by: NURSE PRACTITIONER

## 2022-05-10 PROCEDURE — 3079F DIAST BP 80-89 MM HG: CPT | Mod: CPTII,S$GLB,, | Performed by: NURSE PRACTITIONER

## 2022-05-10 PROCEDURE — 3008F PR BODY MASS INDEX (BMI) DOCUMENTED: ICD-10-PCS | Mod: CPTII,S$GLB,, | Performed by: NURSE PRACTITIONER

## 2022-05-10 PROCEDURE — 99999 PR PBB SHADOW E&M-EST. PATIENT-LVL III: ICD-10-PCS | Mod: PBBFAC,,, | Performed by: NURSE PRACTITIONER

## 2022-05-10 PROCEDURE — 99214 PR OFFICE/OUTPT VISIT, EST, LEVL IV, 30-39 MIN: ICD-10-PCS | Mod: S$GLB,,, | Performed by: NURSE PRACTITIONER

## 2022-05-10 PROCEDURE — 1159F MED LIST DOCD IN RCRD: CPT | Mod: CPTII,S$GLB,, | Performed by: NURSE PRACTITIONER

## 2022-05-10 PROCEDURE — 1159F PR MEDICATION LIST DOCUMENTED IN MEDICAL RECORD: ICD-10-PCS | Mod: CPTII,S$GLB,, | Performed by: NURSE PRACTITIONER

## 2022-05-10 RX ORDER — ZONISAMIDE 100 MG/1
100 CAPSULE ORAL NIGHTLY
Qty: 90 CAPSULE | Refills: 1 | Status: SHIPPED | OUTPATIENT
Start: 2022-05-10 | End: 2023-05-10

## 2022-05-10 RX ORDER — ZONISAMIDE 50 MG/1
50 CAPSULE ORAL NIGHTLY
Qty: 90 CAPSULE | Refills: 1 | Status: SHIPPED | OUTPATIENT
Start: 2022-05-10 | End: 2023-05-10
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
## 2025-07-22 ENCOUNTER — OFFICE VISIT (OUTPATIENT)
Dept: NEUROLOGY | Facility: CLINIC | Age: 45
End: 2025-07-22
Payer: COMMERCIAL

## 2025-07-22 VITALS
HEIGHT: 65 IN | OXYGEN SATURATION: 96 % | RESPIRATION RATE: 16 BRPM | SYSTOLIC BLOOD PRESSURE: 118 MMHG | DIASTOLIC BLOOD PRESSURE: 66 MMHG | HEART RATE: 81 BPM | WEIGHT: 244.5 LBS | BODY MASS INDEX: 40.73 KG/M2

## 2025-07-22 DIAGNOSIS — H47.10 PAPILLEDEMA: ICD-10-CM

## 2025-07-22 DIAGNOSIS — G93.2 PSEUDOTUMOR CEREBRI: Primary | ICD-10-CM

## 2025-07-22 PROBLEM — G43.709 CHRONIC MIGRAINE WITHOUT AURA WITHOUT STATUS MIGRAINOSUS, NOT INTRACTABLE: Status: RESOLVED | Noted: 2020-06-10 | Resolved: 2025-07-22

## 2025-07-22 PROCEDURE — 4010F ACE/ARB THERAPY RXD/TAKEN: CPT | Mod: CPTII,S$GLB,, | Performed by: NURSE PRACTITIONER

## 2025-07-22 PROCEDURE — 99204 OFFICE O/P NEW MOD 45 MIN: CPT | Mod: S$GLB,,, | Performed by: NURSE PRACTITIONER

## 2025-07-22 PROCEDURE — 3008F BODY MASS INDEX DOCD: CPT | Mod: CPTII,S$GLB,, | Performed by: NURSE PRACTITIONER

## 2025-07-22 PROCEDURE — 1159F MED LIST DOCD IN RCRD: CPT | Mod: CPTII,S$GLB,, | Performed by: NURSE PRACTITIONER

## 2025-07-22 PROCEDURE — 3074F SYST BP LT 130 MM HG: CPT | Mod: CPTII,S$GLB,, | Performed by: NURSE PRACTITIONER

## 2025-07-22 PROCEDURE — 99999 PR PBB SHADOW E&M-EST. PATIENT-LVL IV: CPT | Mod: PBBFAC,,, | Performed by: NURSE PRACTITIONER

## 2025-07-22 PROCEDURE — 3078F DIAST BP <80 MM HG: CPT | Mod: CPTII,S$GLB,, | Performed by: NURSE PRACTITIONER

## 2025-07-22 RX ORDER — SERTRALINE HYDROCHLORIDE 100 MG/1
100 TABLET, FILM COATED ORAL DAILY
COMMUNITY

## 2025-07-22 RX ORDER — PHENTERMINE AND TOPIRAMATE 11.25; 69 MG/1; MG/1
1 CAPSULE, EXTENDED RELEASE ORAL EVERY MORNING
COMMUNITY
Start: 2025-05-27

## 2025-07-22 RX ORDER — TOPIRAMATE 25 MG/1
25 TABLET, FILM COATED ORAL NIGHTLY
COMMUNITY

## 2025-07-22 RX ORDER — LISINOPRIL 10 MG/1
10 TABLET ORAL DAILY
COMMUNITY
Start: 2025-05-08
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
## 2025-08-25 ENCOUNTER — TELEPHONE (OUTPATIENT)
Dept: OBSTETRICS AND GYNECOLOGY | Facility: CLINIC | Age: 45
End: 2025-08-25
Payer: COMMERCIAL